# Patient Record
Sex: FEMALE | Race: WHITE | NOT HISPANIC OR LATINO | Employment: PART TIME | URBAN - METROPOLITAN AREA
[De-identification: names, ages, dates, MRNs, and addresses within clinical notes are randomized per-mention and may not be internally consistent; named-entity substitution may affect disease eponyms.]

---

## 2018-03-25 ENCOUNTER — HOSPITAL ENCOUNTER (EMERGENCY)
Facility: HOSPITAL | Age: 23
Discharge: HOME | End: 2018-03-25
Attending: EMERGENCY MEDICINE | Admitting: EMERGENCY MEDICINE

## 2018-03-25 VITALS
HEART RATE: 87 BPM | OXYGEN SATURATION: 100 % | RESPIRATION RATE: 18 BRPM | WEIGHT: 132.28 LBS | TEMPERATURE: 98.4 F | SYSTOLIC BLOOD PRESSURE: 134 MMHG | DIASTOLIC BLOOD PRESSURE: 70 MMHG

## 2018-03-25 DIAGNOSIS — K08.89 TOOTHACHE: Primary | ICD-10-CM

## 2018-03-25 DIAGNOSIS — R11.0 NAUSEA: ICD-10-CM

## 2018-03-25 PROCEDURE — 99282 EMERGENCY DEPT VISIT SF MDM: CPT

## 2018-03-25 RX ORDER — ONDANSETRON 4 MG/1
4 TABLET, ORALLY DISINTEGRATING ORAL ONCE
Status: COMPLETED | OUTPATIENT
Start: 2018-03-25 | End: 2018-03-25

## 2018-03-25 RX ORDER — CLINDAMYCIN HYDROCHLORIDE 150 MG/1
300 CAPSULE ORAL 4 TIMES DAILY
Qty: 56 CAPSULE | Refills: 0 | Status: SHIPPED | OUTPATIENT
Start: 2018-03-25 | End: 2018-04-01

## 2018-03-25 RX ORDER — CLINDAMYCIN HYDROCHLORIDE 150 MG/1
300 CAPSULE ORAL ONCE
Status: COMPLETED | OUTPATIENT
Start: 2018-03-25 | End: 2018-03-25

## 2018-03-25 RX ORDER — OXYCODONE HYDROCHLORIDE AND ACETAMINOPHEN 5; 325 MG/1; MG/1
1 TABLET ORAL ONCE
Status: COMPLETED | OUTPATIENT
Start: 2018-03-25 | End: 2018-03-25

## 2018-03-25 RX ORDER — METOCLOPRAMIDE 10 MG/1
10 TABLET ORAL 4 TIMES DAILY
Qty: 28 TABLET | Refills: 0 | Status: SHIPPED | OUTPATIENT
Start: 2018-03-25 | End: 2018-04-01

## 2018-03-25 RX ORDER — OXYCODONE HYDROCHLORIDE AND ACETAMINOPHEN 5; 325 MG/1; MG/1
1 TABLET ORAL EVERY 4 HOURS PRN
Qty: 20 TABLET | Refills: 0 | Status: SHIPPED | OUTPATIENT
Start: 2018-03-25 | End: 2018-03-29

## 2018-03-25 RX ADMIN — CLINDAMYCIN HYDROCHLORIDE 300 MG: 150 CAPSULE ORAL at 03:23

## 2018-03-25 RX ADMIN — ONDANSETRON 4 MG: 4 TABLET, ORALLY DISINTEGRATING ORAL at 03:22

## 2018-03-25 RX ADMIN — OXYCODONE HYDROCHLORIDE AND ACETAMINOPHEN 1 TABLET: 5; 325 TABLET ORAL at 03:23

## 2018-03-25 NOTE — DISCHARGE INSTRUCTIONS
Acute Nausea and Vomiting   WHAT YOU NEED TO KNOW:   Acute nausea and vomiting start suddenly, worsen quickly, and last a short time  DISCHARGE INSTRUCTIONS:   Return to the emergency department if:   · You see blood in your vomit or your bowel movements  · You have sudden, severe pain in your chest and upper abdomen after hard vomiting or retching  · You have swelling in your neck and chest      · You are dizzy, cold, and thirsty and your eyes and mouth are dry  · You are urinating very little or not at all  · You have muscle weakness, leg cramps, and trouble breathing  · Your heart is beating much faster than normal      · You continue to vomit for more than 48 hours  Contact your healthcare provider if:   · You have frequent dry heaves (vomiting but nothing comes out)  · Your nausea and vomiting does not get better or go away after you use medicine  · You have questions or concerns about your condition or treatment  Medicines: You may need any of the following:  · Medicines  may be given to calm your stomach and stop your vomiting  You may also need medicines to help you feel more relaxed or to stop nausea and vomiting caused by motion sickness  · Gastrointestinal stimulants  are used to help empty your stomach and bowels  This may help decrease nausea and vomiting  · Take your medicine as directed  Contact your healthcare provider if you think your medicine is not helping or if you have side effects  Tell him or her if you are allergic to any medicine  Keep a list of the medicines, vitamins, and herbs you take  Include the amounts, and when and why you take them  Bring the list or the pill bottles to follow-up visits  Carry your medicine list with you in case of an emergency  Prevent or manage acute nausea and vomiting:   · Do not drink alcohol  Alcohol may upset or irritate your stomach  Too much alcohol can also cause acute nausea and vomiting  · Control stress    Headaches due to stress may cause nausea and vomiting  Find ways to relax and manage your stress  Get more rest and sleep  · Drink more liquids as directed  Vomiting can lead to dehydration  It is important to drink more liquids to help replace lost body fluids  Ask your healthcare provider how much liquid to drink each day and which liquids are best for you  Your provider may recommend that you drink an oral rehydration solution (ORS)  ORS contains water, salts, and sugar that are needed to replace the lost body fluids  Ask what kind of ORS to use, how much to drink, and where to get it  · Eat smaller meals, more often  Eat small amounts of food every 2 to 3 hours, even if you are not hungry  Food in your stomach may decrease your nausea  · Talk to your healthcare provider before you take over-the-counter (OTC) medicines  These medicines can cause serious problems if you use certain other medicines, or you have a medical condition  You may have problems if you use too much or use them for longer than the label says  Follow directions on the label carefully  Follow up with your healthcare provider as directed:  Write down your questions so you remember to ask them during your follow-up visits  © 2017 [employer]  Information is for End User's use only and may not be sold, redistributed or otherwise used for commercial purposes  All illustrations and images included in CareNotes® are the copyrighted property of [employer] , [employer]  or [employer]  The above information is an  only  It is not intended as medical advice for individual conditions or treatments  Talk to your doctor, nurse or pharmacist before following any medical regimen to see if it is safe and effective for you  Toothache   WHAT YOU NEED TO KNOW:   A toothache is pain that is caused by irritation of the nerves in the center of your tooth   The irritation may be caused by several problems, such as a cavity, an infection, a cracked tooth, or gum disease  It is very important to follow up with your dentist so the cause of your toothache can be diagnosed and treated  This can help prevent more serious problems  DISCHARGE INSTRUCTIONS:   Medicines: You may  need any of the following:  · NSAIDs  decrease swelling and pain  This medicine can be bought with or without a doctor's order  This medicine can cause stomach bleeding or kidney problems in certain people  If you take blood thinner medicine, always ask your healthcare provider if NSAIDs are safe for you  Always read the medicine label and follow the directions on it before using this medicine  · Acetaminophen  decreases pain  It is available without a doctor's order  Ask how much to take and how often to take it  Follow directions  Acetaminophen can cause liver damage if not taken correctly  · Pain medicine  may be given as a pill or as medicine that you put directly on your tooth or gums  Do not wait until the pain is severe before you take this medicine  · Antibiotics  help fight or prevent an infection caused by bacteria  Take them as directed  · Take your medicine as directed  Contact your healthcare provider if you think your medicine is not helping or if you have side effects  Tell him of her if you are allergic to any medicine  Keep a list of the medicines, vitamins, and herbs you take  Include the amounts, and when and why you take them  Bring the list or the pill bottles to follow-up visits  Carry your medicine list with you in case of an emergency  Follow up with your dentist as directed: You may be referred to a dental surgeon  Write down your questions so you remember to ask them during your visits  Self-care:   · Rinse your mouth with warm salt water 4 times a day or as directed  · You may need to eat soft foods to help relieve pain caused by chewing    Contact your dentist if:   · You have questions or concerns about your condition or care  Return to the emergency department if:   · You have trouble breathing  · You have swelling in your face or neck  · You have a fever and chills  · You have trouble speaking or swallowing  · You have trouble opening or closing your mouth  © 2017 [employer] Information is for End User's use only and may not be sold, redistributed or otherwise used for commercial purposes  All illustrations and images included in CareNotes® are the copyrighted property of [employer]  or [employer]  The above information is an  only  It is not intended as medical advice for individual conditions or treatments  Talk to your doctor, nurse or pharmacist before following any medical regimen to see if it is safe and effective for you

## 2018-03-25 NOTE — ED PROVIDER NOTES
History  Chief Complaint   Patient presents with    Dental Pain     Pt  complains of broken molar on bottom left jaw, states has dentist appt  monday but pain is unbearable and not improving with tylenol or motrin  Patient is a 25year old female with 3 days of worsening left lower tooth pain where broken tooth is  Has appointment with dentist this Monday  Tried ibuprofen without relief  Occasional nausea  No fever  No vomiting  No sob  States she did not drive here  Was last seen in this ED on 4/8/16 for nausea and vomiting  [facility] website checked on this patient and no Rx found  History provided by:  Patient (adri)   used: No    Dental Pain   Associated symptoms: no fever        Prior to Admission Medications   Prescriptions Last Dose Informant Patient Reported? Taking?   citalopram (CeleXA) 10 mg tablet   Yes No   Sig: Take 10 mg by mouth daily  ondansetron (ZOFRAN ODT) 4 mg disintegrating tablet   No No   Sig: Take 2 tablets (8 mg total) by mouth every 8 (eight) hours as needed for nausea or vomiting for up to 6 doses  Facility-Administered Medications: None       History reviewed  No pertinent past medical history  History reviewed  No pertinent surgical history  No family history on file  I have reviewed and agree with the history as documented  Social History   Substance Use Topics    Smoking status: Current Every Day Smoker     Packs/day: 0 50     Types: Cigarettes    Smokeless tobacco: Never Used    Alcohol use No        Review of Systems   Constitutional: Negative for fever  HENT: Positive for dental problem  Respiratory: Negative for shortness of breath  Gastrointestinal: Positive for nausea  Negative for vomiting         Physical Exam  ED Triage Vitals [03/25/18 0220]   Temperature Pulse Respirations Blood Pressure SpO2   98 4 °F (36 9 °C) 87 18 134/70 100 %      Temp Source Heart Rate Source Patient Position - Orthostatic VS BP Location FiO2 (%)   Oral Monitor Sitting Right arm --      Pain Score       7           Orthostatic Vital Signs  Vitals:    03/25/18 0220   BP: 134/70   Pulse: 87   Patient Position - Orthostatic VS: Sitting       Physical Exam   Constitutional: She appears well-developed and well-nourished  She appears distressed (moderate)  HENT:   Head: Normocephalic and atraumatic  Mouth/Throat: Oropharynx is clear and moist    Left mandibular molar tooth fracture with tenderness and no significant gingival swelling  Eyes: No scleral icterus  Neck: Normal range of motion  Neck supple  Pulmonary/Chest: Effort normal  No stridor  No respiratory distress  Neurological: She is alert  Skin: Skin is warm and dry  No rash noted  Nursing note and vitals reviewed  ED Medications  Medications   clindamycin (CLEOCIN) capsule 300 mg (not administered)   oxyCODONE-acetaminophen (PERCOCET) 5-325 mg per tablet 1 tablet (not administered)   ondansetron (ZOFRAN-ODT) dispersible tablet 4 mg (not administered)       Diagnostic Studies  Results Reviewed     None                 No orders to display              Procedures  Procedures       Phone Contacts  ED Phone Contact    ED Course  ED Course                                MDM  Number of Diagnoses or Management Options  Diagnosis management comments: DDx including but not limited to: dental elieser, dental infection, dental abscess, dry socket, gingivitis; doubt lyle's angina          Amount and/or Complexity of Data Reviewed  Decide to obtain previous medical records or to obtain history from someone other than the patient: yes  Review and summarize past medical records: yes      CritCare Time    Disposition  Final diagnoses:   Toothache   Nausea     Time reflects when diagnosis was documented in both MDM as applicable and the Disposition within this note     Time User Action Codes Description Comment    3/25/2018  2:39 AM Selina Antonio Add [K08 89] Toothache 3/25/2018  2:39 AM Oralee Petar Add [R11 0] Nausea       ED Disposition     ED Disposition Condition Comment    Discharge  Diantha Sine discharge to home/self care  Condition at discharge: Stable        Follow-up Information     Follow up With Specialties Details Why Contact Info    own dentist  Go in 2 days Return sooner if increased pain, fever, vomiting, difficulty breathing or swallowing, drooling  No driving with percocet  Do not use acetaminophen with percocet  Patient's Medications   Discharge Prescriptions    CLINDAMYCIN (CLEOCIN) 150 MG CAPSULE    Take 2 capsules (300 mg total) by mouth 4 (four) times a day for 7 days       Start Date: 3/25/2018 End Date: 4/1/2018       Order Dose: 300 mg       Quantity: 56 capsule    Refills: 0    METOCLOPRAMIDE (REGLAN) 10 MG TABLET    Take 1 tablet (10 mg total) by mouth 4 (four) times a day for 7 days As needed for nausea       Start Date: 3/25/2018 End Date: 4/1/2018       Order Dose: 10 mg       Quantity: 28 tablet    Refills: 0    OXYCODONE-ACETAMINOPHEN (PERCOCET) 5-325 MG PER TABLET    Take 1 tablet by mouth every 4 (four) hours as needed for moderate pain for up to 4 days Max Daily Amount: 6 tablets       Start Date: 3/25/2018 End Date: 3/29/2018       Order Dose: 1 tablet       Quantity: 20 tablet    Refills: 0     No discharge procedures on file      ED Provider  Electronically Signed by           Stuart Howard MD  03/25/18 4915

## 2019-07-28 ENCOUNTER — HOSPITAL ENCOUNTER (EMERGENCY)
Facility: HOSPITAL | Age: 24
Discharge: HOME | End: 2019-07-28
Attending: EMERGENCY MEDICINE
Payer: COMMERCIAL

## 2019-07-28 VITALS
SYSTOLIC BLOOD PRESSURE: 156 MMHG | RESPIRATION RATE: 16 BRPM | HEART RATE: 71 BPM | TEMPERATURE: 98.3 F | DIASTOLIC BLOOD PRESSURE: 73 MMHG | OXYGEN SATURATION: 96 %

## 2019-07-28 DIAGNOSIS — K08.89 PAIN, DENTAL: Primary | ICD-10-CM

## 2019-07-28 PROCEDURE — 99282 EMERGENCY DEPT VISIT SF MDM: CPT

## 2019-07-28 PROCEDURE — 99283 EMERGENCY DEPT VISIT LOW MDM: CPT | Performed by: EMERGENCY MEDICINE

## 2019-07-28 RX ORDER — CLINDAMYCIN HYDROCHLORIDE 150 MG/1
450 CAPSULE ORAL EVERY 8 HOURS SCHEDULED
Qty: 63 CAPSULE | Refills: 0 | Status: SHIPPED | OUTPATIENT
Start: 2019-07-28 | End: 2019-08-04

## 2019-07-28 RX ORDER — IBUPROFEN 400 MG/1
400 TABLET ORAL EVERY 6 HOURS PRN
Qty: 30 TABLET | Refills: 0 | Status: SHIPPED | OUTPATIENT
Start: 2019-07-28 | End: 2021-08-01

## 2019-07-28 RX ORDER — ACETAMINOPHEN 325 MG/1
650 TABLET ORAL EVERY 6 HOURS PRN
Qty: 30 TABLET | Refills: 0 | Status: SHIPPED | OUTPATIENT
Start: 2019-07-28 | End: 2019-08-02

## 2019-07-28 NOTE — ED PROVIDER NOTES
History  Chief Complaint   Patient presents with    Dental Pain     Pt presents to the ED with c/o R sided tooth pain  HPI     Patient is a 26year old female with right lower molar tooth pain  Pain is achi  Pain is present for past several days  Worse with touching  No radiation of the pain  No systemic fevers, chills, sweats  No focal neurological defects  No visual disturbance  No hearing loss/tinnitus/vertigo  No pain behind the ear  No parotid gland tenderness  No neck pain or trouble swallowing  No deviation of the tonsils to suggest peritonsillar abscess  No obvious drainable intraoral abscess  No facial rash or blisters  No woodiness or swelling underneath the tongue  No claudication when chewing  No headache  MDM toothache, will treat as such  The patient (and any family present) verbalized understanding of the discharge instructions and warnings that would necessitate return to the Emergency Department  Gave verbal in addition to written discharge instructions  Specifically highlighted areas of special concern regarding the written and verbal discharge instructions and return precautions  All questions were answered prior to discharge  None       History reviewed  No pertinent past medical history  History reviewed  No pertinent surgical history  History reviewed  No pertinent family history  I have reviewed and agree with the history as documented  Social History     Tobacco Use    Smoking status: Current Every Day Smoker     Packs/day: 0 50     Types: Cigarettes    Smokeless tobacco: Never Used   Substance Use Topics    Alcohol use: No    Drug use: No     Types: Marijuana     Comment: history of        Review of Systems   HENT:        Tooth pain   All other systems reviewed and are negative  Physical Exam  Physical Exam   Constitutional: She is oriented to person, place, and time  She appears well-developed and well-nourished  HENT:   Head: Normocephalic and atraumatic  Right Ear: External ear normal    Left Ear: External ear normal    Right lower molar with dental caries   Eyes: Conjunctivae and EOM are normal    Neck: Normal range of motion  Neck supple  No JVD present  No tracheal deviation present  Cardiovascular: Normal rate, regular rhythm and normal heart sounds  Pulmonary/Chest: Effort normal  No respiratory distress  She has no wheezes  She has no rales  Abdominal: Soft  Bowel sounds are normal  There is no tenderness  There is no rebound and no guarding  Musculoskeletal: She exhibits no edema or tenderness  Neurological: She is alert and oriented to person, place, and time  Skin: Skin is warm and dry  No rash noted  No erythema  Psychiatric: She has a normal mood and affect  Thought content normal    Nursing note and vitals reviewed  Vital Signs  ED Triage Vitals [07/28/19 0558]   Temperature Pulse Respirations Blood Pressure SpO2   98 3 °F (36 8 °C) 71 16 156/73 96 %      Temp Source Heart Rate Source Patient Position - Orthostatic VS BP Location FiO2 (%)   Oral Monitor Sitting Right arm --      Pain Score       7           Vitals:    07/28/19 0558   BP: 156/73   Pulse: 71   Patient Position - Orthostatic VS: Sitting         Visual Acuity      ED Medications  Medications - No data to display    Diagnostic Studies  Results Reviewed     None                 No orders to display              Procedures  Procedures       ED Course                               MDM    Disposition  Final diagnoses:   Pain, dental     Time reflects when diagnosis was documented in both MDM as applicable and the Disposition within this note     Time User Action Codes Description Comment    7/28/2019  5:59 AM Fariba GOLDMAN Add [K08 89] Pain, dental       ED Disposition     ED Disposition Condition Date/Time Comment    Discharge Stable Sun Jul 28, 2019  5:58 AM Judy Elias discharge to home/self care              Follow-up Information     Follow up With Specialties Details Why Contact Info    Mercedes Jackson DO Family Medicine In 1 day  [address] [address]  [phone]            Patient's Medications   Discharge Prescriptions    ACETAMINOPHEN (TYLENOL) 325 MG TABLET    Take 2 tablets (650 mg total) by mouth every 6 (six) hours as needed for mild pain for up to 5 days       Start Date: 7/28/2019 End Date: 8/2/2019       Order Dose: 650 mg       Quantity: 30 tablet    Refills: 0    CLINDAMYCIN (CLEOCIN) 150 MG CAPSULE    Take 3 capsules (450 mg total) by mouth every 8 (eight) hours for 7 days       Start Date: 7/28/2019 End Date: 8/4/2019       Order Dose: 450 mg       Quantity: 63 capsule    Refills: 0    IBUPROFEN (MOTRIN) 400 MG TABLET    Take 1 tablet (400 mg total) by mouth every 6 (six) hours as needed for mild pain for up to 5 days       Start Date: 7/28/2019 End Date: 8/2/2019       Order Dose: 400 mg       Quantity: 30 tablet    Refills: 0     No discharge procedures on file      ED Provider  Electronically Signed by           Nivia Narayanan MD  07/28/19 7409

## 2020-06-20 ENCOUNTER — HOSPITAL ENCOUNTER (EMERGENCY)
Facility: HOSPITAL | Age: 25
Discharge: HOME | End: 2020-06-20
Attending: EMERGENCY MEDICINE | Admitting: EMERGENCY MEDICINE
Payer: COMMERCIAL

## 2020-06-20 VITALS
SYSTOLIC BLOOD PRESSURE: 132 MMHG | DIASTOLIC BLOOD PRESSURE: 70 MMHG | BODY MASS INDEX: 25.59 KG/M2 | TEMPERATURE: 98.8 F | HEART RATE: 78 BPM | HEIGHT: 63 IN | RESPIRATION RATE: 16 BRPM | OXYGEN SATURATION: 99 % | WEIGHT: 144.4 LBS

## 2020-06-20 DIAGNOSIS — K04.7 DENTAL INFECTION: Primary | ICD-10-CM

## 2020-06-20 PROCEDURE — 99284 EMERGENCY DEPT VISIT MOD MDM: CPT | Performed by: PHYSICIAN ASSISTANT

## 2020-06-20 PROCEDURE — 99282 EMERGENCY DEPT VISIT SF MDM: CPT

## 2020-06-20 RX ORDER — OXYCODONE HYDROCHLORIDE AND ACETAMINOPHEN 5; 325 MG/1; MG/1
1 TABLET ORAL EVERY 6 HOURS PRN
Qty: 10 TABLET | Refills: 0 | Status: SHIPPED | OUTPATIENT
Start: 2020-06-20 | End: 2021-08-01

## 2021-04-13 DIAGNOSIS — Z23 ENCOUNTER FOR IMMUNIZATION: ICD-10-CM

## 2021-08-01 ENCOUNTER — APPOINTMENT (EMERGENCY)
Dept: CT IMAGING | Facility: HOSPITAL | Age: 26
End: 2021-08-01
Payer: COMMERCIAL

## 2021-08-01 ENCOUNTER — HOSPITAL ENCOUNTER (EMERGENCY)
Facility: HOSPITAL | Age: 26
Discharge: HOME | End: 2021-08-01
Attending: EMERGENCY MEDICINE | Admitting: EMERGENCY MEDICINE
Payer: COMMERCIAL

## 2021-08-01 VITALS
SYSTOLIC BLOOD PRESSURE: 157 MMHG | TEMPERATURE: 98.8 F | BODY MASS INDEX: 23.35 KG/M2 | HEART RATE: 86 BPM | WEIGHT: 131.84 LBS | RESPIRATION RATE: 20 BRPM | OXYGEN SATURATION: 99 % | DIASTOLIC BLOOD PRESSURE: 82 MMHG

## 2021-08-01 DIAGNOSIS — R55 NEAR SYNCOPE: Primary | ICD-10-CM

## 2021-08-01 LAB
ALBUMIN SERPL BCP-MCNC: 4.1 G/DL (ref 3.5–5)
ALP SERPL-CCNC: 62 U/L (ref 46–116)
ALT SERPL W P-5'-P-CCNC: 17 U/L (ref 12–78)
ANION GAP SERPL CALCULATED.3IONS-SCNC: 10 MMOL/L (ref 4–13)
AST SERPL W P-5'-P-CCNC: 15 U/L (ref 5–45)
BASOPHILS # BLD AUTO: 0.09 THOUSANDS/ΜL (ref 0–0.1)
BASOPHILS NFR BLD AUTO: 1 % (ref 0–1)
BILIRUB SERPL-MCNC: 0.52 MG/DL (ref 0.2–1)
BUN SERPL-MCNC: 10 MG/DL (ref 5–25)
CALCIUM SERPL-MCNC: 8.9 MG/DL (ref 8.3–10.1)
CHLORIDE SERPL-SCNC: 103 MMOL/L (ref 100–108)
CO2 SERPL-SCNC: 26 MMOL/L (ref 21–32)
CREAT SERPL-MCNC: 0.71 MG/DL (ref 0.6–1.3)
EOSINOPHIL # BLD AUTO: 0.27 THOUSAND/ΜL (ref 0–0.61)
EOSINOPHIL NFR BLD AUTO: 3 % (ref 0–6)
ERYTHROCYTE [DISTWIDTH] IN BLOOD BY AUTOMATED COUNT: 12.1 % (ref 11.6–15.1)
GFR SERPL CREATININE-BSD FRML MDRD: 120 ML/MIN/1.73SQ M
GLUCOSE SERPL-MCNC: 87 MG/DL (ref 65–140)
GLUCOSE SERPL-MCNC: 99 MG/DL (ref 65–140)
HCT VFR BLD AUTO: 42 % (ref 34.8–46.1)
HGB BLD-MCNC: 14.1 G/DL (ref 11.5–15.4)
IMM GRANULOCYTES # BLD AUTO: 0.05 THOUSAND/UL (ref 0–0.2)
IMM GRANULOCYTES NFR BLD AUTO: 1 % (ref 0–2)
LYMPHOCYTES # BLD AUTO: 3.63 THOUSANDS/ΜL (ref 0.6–4.47)
LYMPHOCYTES NFR BLD AUTO: 38 % (ref 14–44)
MCH RBC QN AUTO: 29.4 PG (ref 26.8–34.3)
MCHC RBC AUTO-ENTMCNC: 33.6 G/DL (ref 31.4–37.4)
MCV RBC AUTO: 88 FL (ref 82–98)
MONOCYTES # BLD AUTO: 0.6 THOUSAND/ΜL (ref 0.17–1.22)
MONOCYTES NFR BLD AUTO: 6 % (ref 4–12)
NEUTROPHILS # BLD AUTO: 4.82 THOUSANDS/ΜL (ref 1.85–7.62)
NEUTS SEG NFR BLD AUTO: 51 % (ref 43–75)
NRBC BLD AUTO-RTO: 0 /100 WBCS
PLATELET # BLD AUTO: 245 THOUSANDS/UL (ref 149–390)
PMV BLD AUTO: 9.9 FL (ref 8.9–12.7)
POTASSIUM SERPL-SCNC: 3.8 MMOL/L (ref 3.5–5.3)
PROT SERPL-MCNC: 7.2 G/DL (ref 6.4–8.2)
RBC # BLD AUTO: 4.79 MILLION/UL (ref 3.81–5.12)
SODIUM SERPL-SCNC: 139 MMOL/L (ref 136–145)
TROPONIN I SERPL-MCNC: <0.02 NG/ML
TSH SERPL DL<=0.05 MIU/L-ACNC: 1.43 UIU/ML (ref 0.36–3.74)
WBC # BLD AUTO: 9.46 THOUSAND/UL (ref 4.31–10.16)

## 2021-08-01 PROCEDURE — 96360 HYDRATION IV INFUSION INIT: CPT

## 2021-08-01 PROCEDURE — 80053 COMPREHEN METABOLIC PANEL: CPT | Performed by: PHYSICIAN ASSISTANT

## 2021-08-01 PROCEDURE — 84443 ASSAY THYROID STIM HORMONE: CPT | Performed by: PHYSICIAN ASSISTANT

## 2021-08-01 PROCEDURE — 93005 ELECTROCARDIOGRAM TRACING: CPT

## 2021-08-01 PROCEDURE — G1004 CDSM NDSC: HCPCS

## 2021-08-01 PROCEDURE — 84484 ASSAY OF TROPONIN QUANT: CPT | Performed by: PHYSICIAN ASSISTANT

## 2021-08-01 PROCEDURE — 82948 REAGENT STRIP/BLOOD GLUCOSE: CPT

## 2021-08-01 PROCEDURE — 36415 COLL VENOUS BLD VENIPUNCTURE: CPT | Performed by: PHYSICIAN ASSISTANT

## 2021-08-01 PROCEDURE — 99285 EMERGENCY DEPT VISIT HI MDM: CPT | Performed by: PHYSICIAN ASSISTANT

## 2021-08-01 PROCEDURE — 99284 EMERGENCY DEPT VISIT MOD MDM: CPT

## 2021-08-01 PROCEDURE — 85025 COMPLETE CBC W/AUTO DIFF WBC: CPT | Performed by: PHYSICIAN ASSISTANT

## 2021-08-01 PROCEDURE — 70450 CT HEAD/BRAIN W/O DYE: CPT

## 2021-08-01 RX ADMIN — SODIUM CHLORIDE 1000 ML: 0.9 INJECTION, SOLUTION INTRAVENOUS at 20:04

## 2021-08-01 NOTE — Clinical Note
Rosanne Friends was seen and treated in our emergency department on 8/1/2021  Diagnosis:     Darrius    She may return on this date: 08/03/2021         If you have any questions or concerns, please don't hesitate to call        Sin Madsen PA-C    ______________________________           _______________          _______________  Hospital Representative                              Date                                Time

## 2021-08-01 NOTE — ED PROVIDER NOTES
History  Chief Complaint   Patient presents with    Syncope     per pt "she has been having some near syncopal episodes foe a while now with some lightheadedness"     Bhupendra Jama is a 26 y o  female who presents to the ED with complaints of near syncope  Patient states she has felt lightheaded today and had 2 episode of nearly passing out  Patient states on the first occasion she was sitting down at 1200 eating breakfast when she felt like she was going to pass out  Patient states on the second occasion she was walking and felt hot/sweaty  Patient states she has been having irregular menstrual cycles over the past few months  Denies head injury, headache, neck pain, neck stiffness, numbness, tingling, weakness, visual changes, nausea, vomiting, diarrhea, abdominal pain, chest pain, shortness breath, fever, chills, leg pain, leg swelling  Denies exogenous estrogen  Denies family history of early cardiac disease or sudden death  Patient states she was told that she had a heart murmur in childhood  Patient denies chance of pregnancy and is in a monogamous relationship with a female  History provided by:  Patient  Dizziness  Quality:  Lightheadedness  Duration:  1 day  Context: not when bending over, not with head movement and not when standing up    Associated symptoms: palpitations (States this is due to "anxiety" associated with feelling like she is going to pass out)    Associated symptoms: no blood in stool, no chest pain, no diarrhea, no headaches, no hearing loss, no nausea, no shortness of breath, no syncope, no tinnitus, no vision changes, no vomiting and no weakness        None       History reviewed  No pertinent past medical history  Past Surgical History:   Procedure Laterality Date    TONSILLECTOMY         History reviewed  No pertinent family history  I have reviewed and agree with the history as documented      E-Cigarette/Vaping    E-Cigarette Use Current Some Day User E-Cigarette/Vaping Substances    Nicotine Yes      Social History     Tobacco Use    Smoking status: Former Smoker     Years: 5 00     Quit date: 6/20/2018     Years since quitting: 3 1    Smokeless tobacco: Never Used   Vaping Use    Vaping Use: Some days    Substances: Nicotine   Substance Use Topics    Alcohol use: No    Drug use: No     Types: Marijuana     Comment: history of       Review of Systems   Constitutional: Positive for diaphoresis  Negative for appetite change, chills, fever and unexpected weight change  HENT: Negative for congestion, drooling, ear pain, hearing loss, rhinorrhea, sore throat, tinnitus, trouble swallowing and voice change  Eyes: Negative for pain, discharge, redness and visual disturbance  Respiratory: Negative for cough, shortness of breath, wheezing and stridor  Cardiovascular: Positive for palpitations (States this is due to "anxiety" associated with feelling like she is going to pass out)  Negative for chest pain, leg swelling and syncope  Gastrointestinal: Negative for abdominal pain, blood in stool, constipation, diarrhea, nausea and vomiting  Genitourinary: Positive for menstrual problem  Negative for decreased urine volume, dysuria, flank pain, frequency, hematuria, urgency, vaginal bleeding and vaginal discharge  Musculoskeletal: Negative for gait problem, joint swelling, neck pain and neck stiffness  Skin: Negative for color change and rash  Neurological: Positive for dizziness and light-headedness  Negative for seizures, weakness and headaches  Shakiness       Physical Exam  Physical Exam  Vitals and nursing note reviewed  Constitutional:       General: She is not in acute distress  Appearance: She is well-developed  She is not ill-appearing  HENT:      Head: Normocephalic and atraumatic        Right Ear: Tympanic membrane, ear canal and external ear normal       Left Ear: Tympanic membrane, ear canal and external ear normal  Nose: Nose normal       Mouth/Throat:      Mouth: Mucous membranes are moist    Eyes:      Conjunctiva/sclera: Conjunctivae normal       Pupils: Pupils are equal, round, and reactive to light  Cardiovascular:      Rate and Rhythm: Normal rate and regular rhythm  Heart sounds: Murmur heard  Pulmonary:      Effort: Pulmonary effort is normal       Breath sounds: Normal breath sounds  Abdominal:      General: Abdomen is flat  Bowel sounds are normal       Tenderness: There is no abdominal tenderness  Musculoskeletal:         General: Normal range of motion  Cervical back: Normal range of motion and neck supple  Skin:     General: Skin is warm and dry  Capillary Refill: Capillary refill takes less than 2 seconds  Neurological:      General: No focal deficit present  Mental Status: She is alert and oriented to person, place, and time  GCS: GCS eye subscore is 4  GCS verbal subscore is 5  GCS motor subscore is 6  Cranial Nerves: Cranial nerves are intact  Sensory: Sensation is intact  Motor: Motor function is intact  Coordination: Coordination is intact  Gait: Gait is intact  Deep Tendon Reflexes: Reflexes are normal and symmetric           Vital Signs  ED Triage Vitals [08/01/21 1832]   Temperature Pulse Respirations Blood Pressure SpO2   98 8 °F (37 1 °C) 86 20 157/82 99 %      Temp Source Heart Rate Source Patient Position - Orthostatic VS BP Location FiO2 (%)   Oral Monitor Lying Right arm --      Pain Score       No Pain           Vitals:    08/01/21 1832   BP: 157/82   Pulse: 86   Patient Position - Orthostatic VS: Lying         Visual Acuity      ED Medications  Medications   sodium chloride 0 9 % bolus 1,000 mL (1,000 mL Intravenous New Bag 8/1/21 2004)       Diagnostic Studies  Results Reviewed     Procedure Component Value Units Date/Time    TSH [[id number]]  (Normal) Collected: 08/01/21 1921    Lab Status: Final result Specimen: Blood from Arm, Right Updated: 08/01/21 2016     TSH 3RD GENERATON 1 429 uIU/mL     Narrative:      Patients undergoing fluorescein dye angiography may retain small amounts of fluorescein in the body for 48-72 hours post procedure  Samples containing fluorescein can produce falsely depressed TSH values  If the patient had this procedure,a specimen should be resubmitted post fluorescein clearance        Comprehensive metabolic panel [[id number]] Collected: 08/01/21 1921    Lab Status: Final result Specimen: Blood from Arm, Right Updated: 08/01/21 2014     Sodium 139 mmol/L      Potassium 3 8 mmol/L      Chloride 103 mmol/L      CO2 26 mmol/L      ANION GAP 10 mmol/L      BUN 10 mg/dL      Creatinine 0 71 mg/dL      Glucose 87 mg/dL      Calcium 8 9 mg/dL      AST 15 U/L      ALT 17 U/L      Alkaline Phosphatase 62 U/L      Total Protein 7 2 g/dL      Albumin 4 1 g/dL      Total Bilirubin 0 52 mg/dL      eGFR 120 ml/min/1 73sq m     Narrative:      [place] guidelines for Chronic Kidney Disease (CKD):     Stage 1 with normal or high GFR (GFR > 90 mL/min/1 73 square meters)    Stage 2 Mild CKD (GFR = 60-89 mL/min/1 73 square meters)    Stage 3A Moderate CKD (GFR = 45-59 mL/min/1 73 square meters)    Stage 3B Moderate CKD (GFR = 30-44 mL/min/1 73 square meters)    Stage 4 Severe CKD (GFR = 15-29 mL/min/1 73 square meters)    Stage 5 End Stage CKD (GFR <15 mL/min/1 73 square meters)  Note: GFR calculation is accurate only with a steady state creatinine    Troponin I [[id number]]  (Normal) Collected: 08/01/21 1921    Lab Status: Final result Specimen: Blood from Arm, Right Updated: 08/01/21 2010     Troponin I <0 02 ng/mL     CBC and differential [[id number]] Collected: 08/01/21 1921    Lab Status: Final result Specimen: Blood from Arm, Right Updated: 08/01/21 1945     WBC 9 46 Thousand/uL      RBC 4 79 Million/uL      Hemoglobin 14 1 g/dL      Hematocrit 42 0 %      MCV 88 fL      MCH 29 4 pg      MCHC 33 6 g/dL RDW 12 1 %      MPV 9 9 fL      Platelets 096 Thousands/uL      nRBC 0 /100 WBCs      Neutrophils Relative 51 %      Immat GRANS % 1 %      Lymphocytes Relative 38 %      Monocytes Relative 6 %      Eosinophils Relative 3 %      Basophils Relative 1 %      Neutrophils Absolute 4 82 Thousands/µL      Immature Grans Absolute 0 05 Thousand/uL      Lymphocytes Absolute 3 63 Thousands/µL      Monocytes Absolute 0 60 Thousand/µL      Eosinophils Absolute 0 27 Thousand/µL      Basophils Absolute 0 09 Thousands/µL     Fingerstick Glucose (POCT) [[id number]]  (Normal) Collected: 08/01/21 1834    Lab Status: Final result Updated: 08/01/21 1837     POC Glucose 99 mg/dl                  CT head without contrast    (Results Pending)              Procedures  ECG 12 Lead Documentation Only    Date/Time: 8/1/2021 7:27 PM  Performed by: Steffi Gant PA-C  Authorized by: Kenna Alonso MD     Indications / Diagnosis:  Near Syncope  Patient location:  ED  Rate:     ECG rate:  65    ECG rate assessment: normal    Rhythm:     Rhythm: sinus rhythm    QRS:     QRS axis:  Normal  ST segments:     ST segments:  Normal  T waves:     T waves: flattening      Flattening:  V2  Comments:      No acute ST or T wave changes  QT/QTc 384/399             ED Course                                           MDM  Number of Diagnoses or Management Options  Near syncope: new and requires workup  Diagnosis management comments: EKG unremarkable  Labs WNL  CT Head pending  Signed out to Wade Salcedo PA-C pending CT Head and final disposition          Amount and/or Complexity of Data Reviewed  Clinical lab tests: ordered and reviewed  Tests in the radiology section of CPT®: ordered and reviewed  Review and summarize past medical records: yes  Discuss the patient with other providers: yes Clayton Glad)    Patient Progress  Patient progress: stable      Disposition  Final diagnoses:   Near syncope     Time reflects when diagnosis was documented in both MDM as applicable and the Disposition within this note     Time User Action Codes Description Comment    8/1/2021  7:29 PM Patt David Add [R55] Near syncope       ED Disposition     None      Follow-up Information     Follow up With Specialties Details Why Contact Info Additional [address] Emergency Department Emergency Medicine Go to  If symptoms worsen [address] [facility] [address] [facility] Emergency Department, [address], [place], [address],  Family Medicine Schedule an appointment as soon as possible for a visit   [address] [facility] [address] [facility] Cardiology Call   [address] [facility] [phone]  [address] [facility] Cardiology [address] [facility], [address], [place], [facility] 59          Patient's Medications   Discharge Prescriptions    No medications on file     No discharge procedures on file      PDMP Review     None          ED Provider  Electronically Signed by           Alma Erwin PA-C  08/01/21 2027

## 2021-08-02 LAB
ATRIAL RATE: 65 BPM
P AXIS: 58 DEGREES
PR INTERVAL: 166 MS
QRS AXIS: 90 DEGREES
QRSD INTERVAL: 80 MS
QT INTERVAL: 384 MS
QTC INTERVAL: 399 MS
T WAVE AXIS: 57 DEGREES
VENTRICULAR RATE: 65 BPM

## 2021-08-02 PROCEDURE — 93010 ELECTROCARDIOGRAM REPORT: CPT | Performed by: INTERNAL MEDICINE

## 2021-08-02 NOTE — ED CARE HANDOFF
Emergency Department Sign Out Note        Sign out and transfer of care from [place], [place]  See Separate Emergency Department note  The patient, Juancho Medrano, was evaluated by the previous provider for near-syncope  Workup Completed:  TSH, CMP, CBC, troponin, ECG, fingerstick glucose    ED Course / Workup Pending (followup):  CT head without contrast-impression no acute intracranial abnormality "  Patient with verbal understanding of all clinical laboratory imaging findings, discharge instructions, follow-up verbalized agreement with patient current treatment plan  Follow-up with Cardiology, PCP, emergency department symptoms persist or exacerbate                                ED Course as of Aug 01 2053   Corey De La Torre Aug 01, 2021   2020 Sign out from [place], [place] with dispo pending CT, and follow-up with cardiology for murmur          Procedures  MDM    Disposition  Final diagnoses:   Near syncope     Time reflects when diagnosis was documented in both MDM as applicable and the Disposition within this note     Time User Action Codes Description Comment    8/1/2021  7:29 PM Shahzad Gtz Add [R55] Near syncope       ED Disposition     None      Follow-up Information     Follow up With Specialties Details Why Contact Info Additional [address] Emergency Department Emergency Medicine Go to  If symptoms worsen 2220 [facility] [address] [facility] Emergency Department, [address] [place], [place], [address],  Family Medicine Schedule an appointment as soon as possible for a visit   [address] [place] [address] [facility] [place] Cardiology Call   [address] [place] [phone] [address] Cardiology [address] [facility], [address], [place], [place] [address] Patient's Medications   Discharge Prescriptions    No medications on file     No discharge procedures on file         ED Provider  Electronically Signed by     Kae Sky PA-C  08/01/21 8516

## 2021-08-02 NOTE — DISCHARGE INSTRUCTIONS
Increase fluid intake  Follow up with your PCP  Make an appointment with cardiology    Syncope   WHAT YOU NEED TO KNOW:   Syncope is also called fainting or passing out  Syncope is a sudden, temporary loss of consciousness, followed by a fall from a standing or sitting position  Syncope ranges from not serious to a sign of a more serious condition that needs to be treated  You can control some health conditions that cause syncope  Your healthcare providers can help you create a plan to manage syncope and prevent episodes  DISCHARGE INSTRUCTIONS:   Seek care immediately if:   You are bleeding because you hit your head when you fainted  You suddenly have double vision, difficulty speaking, numbness, and cannot move your arms or legs  You have chest pain and trouble breathing  You vomit blood or material that looks like coffee grounds  You see blood in your bowel movement  Contact your healthcare provider if:   You have new or worsening symptoms  You have another syncope episode  You have a headache, fast heartbeat, or feel too dizzy to stand up  You have questions or concerns about your condition or care  Medicines:   Medicines  may be needed to help your heart pump strongly and regularly  Your healthcare provider may also make changes to any medicines that are causing syncope  Take your medicine as directed  Contact your healthcare provider if you think your medicine is not helping or if you have side effects  Tell him or her if you are allergic to any medicine  Keep a list of the medicines, vitamins, and herbs you take  Include the amounts, and when and why you take them  Bring the list or the pill bottles to follow-up visits  Carry your medicine list with you in case of an emergency  Follow up with your healthcare provider as directed:  Write down your questions so you remember to ask them during your visits  Manage syncope:   Keep a record of your syncope episodes    Include your symptoms and your activity before and after the episode  The record can help your healthcare provider find the cause of your syncope and help you manage episodes  Sit or lie down when needed  This includes when you feel dizzy, your throat is getting tight, and your vision changes  Raise your legs above the level of your heart  Take slow, deep breaths if you start to breathe faster with anxiety or fear  This can help decrease dizziness and the feeling that you might faint  Check your blood pressure often  This is important if you take medicine to lower your blood pressure  Check your blood pressure when you are lying down and when you are standing  Ask how often to check during the day  Keep a record of your blood pressure numbers  Your healthcare provider may use the record to help plan your treatment  Prevent a syncope episode: Move slowly and let yourself get used to one position before you move to another position  This is very important when you change from a lying or sitting position to a standing position  Take some deep breaths before you stand up from a lying position  Stand up slowly  Sudden movements may cause a fainting spell  Sit on the side of the bed or couch for a few minutes before you stand up  If you are on bedrest, try to be upright for about 2 hours each day, or as directed  Do not lock your legs if you are standing for a long period of time  Move your legs and bend your knees to keep blood flowing  Follow your healthcare provider's recommendations  Your provider may  recommend that you drink more liquids to prevent dehydration  You may also need to have more salt to keep your blood pressure from dropping too low and causing syncope  Your provider will tell you how much liquid and sodium to have each day  He or she will also tell you how much physical activity is safe for you  This will depend on what is causing your syncope  Watch for signs of low blood sugar  These include hunger, nervousness, sweating, and fast or fluttery heartbeats  Talk with your healthcare provider about ways to keep your blood sugar level steady  Do not strain if you are constipated  You may faint if you strain to have a bowel movement  Walking is the best way to get your bowels moving  Eat foods high in fiber to make it easier to have a bowel movement  Good examples are high-fiber cereals, beans, vegetables, and whole-grain breads  Prune juice may help make bowel movements softer  Be careful in hot weather  Heat can cause a syncope episode  Limit activity done outside on hot days  Physical activity in hot weather can lead to dehydration  This can cause an episode  © Copyright [employer] 2021 Information is for End User's use only and may not be sold, redistributed or otherwise used for commercial purposes  All illustrations and images included in CareNotes® are the copyrighted property of [employer]  or [facility] Isaac Hensley   The above information is an  only  It is not intended as medical advice for individual conditions or treatments  Talk to your doctor, nurse or pharmacist before following any medical regimen to see if it is safe and effective for you

## 2021-09-23 ENCOUNTER — OFFICE VISIT (OUTPATIENT)
Dept: CARDIOLOGY CLINIC | Facility: MEDICAL CENTER | Age: 26
End: 2021-09-23
Payer: COMMERCIAL

## 2021-09-23 VITALS
OXYGEN SATURATION: 98 % | SYSTOLIC BLOOD PRESSURE: 96 MMHG | HEART RATE: 82 BPM | BODY MASS INDEX: 23.28 KG/M2 | WEIGHT: 131.4 LBS | DIASTOLIC BLOOD PRESSURE: 60 MMHG

## 2021-09-23 DIAGNOSIS — R55 NEAR SYNCOPE: Primary | ICD-10-CM

## 2021-09-23 DIAGNOSIS — R01.1 CARDIAC MURMUR: ICD-10-CM

## 2021-09-23 PROCEDURE — 99204 OFFICE O/P NEW MOD 45 MIN: CPT | Performed by: INTERNAL MEDICINE

## 2021-09-23 PROCEDURE — 93000 ELECTROCARDIOGRAM COMPLETE: CPT | Performed by: INTERNAL MEDICINE

## 2021-09-23 RX ORDER — AMOXICILLIN 500 MG/1
CAPSULE ORAL
COMMUNITY
Start: 2021-09-21 | End: 2022-03-15

## 2021-09-23 NOTE — PATIENT INSTRUCTIONS
ECHO planned to evaluate heart function and rule out significant valvular heart disease  Nonpharmacologic measures for management of  Positional lightheadedness and near fainting:       Avoidance of prolonged standing and dehydration  Lower extremity exercises advised  Avoid excessive heat exposure

## 2021-09-23 NOTE — ASSESSMENT & PLAN NOTE
Grade 3 systolic cardiac murmur, best heard in the  Standing up position  Echocardiogram planned to evaluate for valvular heart disease and rule out hypertrophic cardiomyopathy

## 2021-09-23 NOTE — LETTER
2021     Leah Carranza, [address] [place] [address] Arsalan Colon    Patient: Tomas Díaz   YOB: 1995   Date of Visit: 2021       Dear Dr Georgiana Walker:    Thank you for referring Tomas Díaz to me for evaluation  Below are my notes for this consultation  If you have questions, please do not hesitate to call me  I look forward to following your patient along with you  Sincerely,        Vesta Chaidez MD        CC: No Recipients  Vesta Chaidez MD  2021 11:44 AM  Sign when Signing Visit   [address] [address]  Phone#  [phone]  Fax#  [phone] [address] Cardiology Office Consultation             NAME: Tomas Díaz  AGE: 23 y o  SEX: female   : 1995   MRN: [id number]    DATE: 2021  TIME: 11:43 AM    Assessment and plan:    1  Near syncope  Assessment & Plan:    Recent episodes of near-syncope  Low likelihood of arrhythmic etiology  Nonpharmacologic measures to prevent orthostatic dizziness including staying hydrated, liberal salt intake, lower extremity exercises discussed  If blood pressure remains low, compression stockings also may be helpful  Orders:  -     POCT ECG  -     Echo complete with contrast if indicated; Future; Expected date: 2021    2  Cardiac murmur  Assessment & Plan:    Grade 3 systolic cardiac murmur, best heard in the  Standing up position  Echocardiogram planned to evaluate for valvular heart disease and rule out hypertrophic cardiomyopathy  Orders:  -     Echo complete with contrast if indicated; Future; Expected date: 2021         Chief Complaint   Patient presents with    New Patient Visit     pt visited ER for dizziness and a heart murmur was found       HPI:    Tomas Díaz is a 23y o -year-old female who presents to the cardiology clinic for evaluation      She had ER visit on the  of this year with feeling lightheaded and 2 episodes of nearly passing out  First episode, she was sitting eating breakfast and felt lightheaded and got presyncopal   Second episode happened when she was walking, felt hot, sweaty and lightheaded  Throughout the day she felt lightheaded and not herself  No rishi syncope  ER workup unremarkable  Reported some palpitations , only when she is anxious but this does not result in any dizziness or lightheadedness  She was told in the emergency room that she had a cardiac murmur and that further cardiac evaluation was indicated  She states that at age 4, she was referred to a cardiologist for a cardiac murmur and was told that she would outgrow her murmur  Current symptoms:   No further episodes of   Near syncope  She does report that when she gets up too fast after sitting for a while, she gets lightheaded  No palpitations reported with this  No family history of sudden death, hypertrophic cardiomyopathy or cardiac arrhythmias  Past history, family history, social history, current medications, vital signs, recent lab and imaging studies reviewed independently on this visit  Cardiology Problem list:  Presyncope:  8/21: CT head negative  Palpitations    BP Readings from Last 4 Encounters:   09/23/21 96/60   08/01/21 157/82   06/20/20 132/70   07/28/19 156/73      Wt Readings from Last 3 Encounters:   09/23/21 59 6 kg (131 lb 6 4 oz)   08/01/21 59 8 kg (131 lb 13 4 oz)   06/20/20 65 5 kg (144 lb 6 4 oz)       No results found for: [address]  Lab Results   Component Value Date    CREATININE 0 71 08/01/2021          ALLERGIES:  Allergies   Allergen Reactions    Augmentin [Amoxicillin-Pot Clavulanate]          Current Outpatient Medications:     amoxicillin (AMOXIL) 500 mg capsule, TAKE 1 CAPSULE BY MOUTH THREE TIMES DAILY UNTIL FINISHED, Disp: , Rfl:       Review of Systems    History reviewed  No pertinent past medical history      Past Surgical History:   Procedure Laterality Date    TONSILLECTOMY         Family History   Problem Relation Age of Onset    Hypertension Maternal Grandmother     Hyperlipidemia Maternal Grandmother     Cancer Maternal Grandmother     Prostate cancer Other     Colon cancer Other        Social History     Socioeconomic History    Marital status: /Civil Union     Spouse name: Not on file    Number of children: Not on file    Years of education: Not on file    Highest education level: Not on file   Occupational History    Not on file   Tobacco Use    Smoking status: Former Smoker     Years: 5 00     Quit date: 6/20/2018     Years since quitting: 3 2    Smokeless tobacco: Never Used   Vaping Use    Vaping Use: Some days    Substances: Nicotine   Substance and Sexual Activity    Alcohol use: Yes    Drug use: No     Types: Marijuana     Comment: history of    Sexual activity: Not on file   Other Topics Concern    Not on file   Social History Narrative    Not on file     Social Determinants of Health     Financial Resource Strain:     Difficulty of Paying Living Expenses:    Food Insecurity:     Worried About Running Out of Food in the Last Year:     [address] in the Last Year:    Transportation Needs:     Lack of Transportation (Medical):      Lack of Transportation (Non-Medical):    Physical Activity:     Days of Exercise per Week:     Minutes of Exercise per Session:    Stress:     Feeling of Stress :    Social Connections:     Frequency of Communication with Friends and Family:     Frequency of Social Gatherings with Friends and Family:     Attends Mandaeism Services:     Active Member of Clubs or Organizations:     Attends Club or Organization Meetings:     Marital Status:    Intimate Partner Violence:     Fear of Current or Ex-Partner:     Emotionally Abused:     Physically Abused:     Sexually Abused:          Objective:     Vitals:    09/23/21 1113   BP: 96/60   Pulse: 82   SpO2: 98%     Wt Readings from Last 3 Encounters:   09/23/21 59 6 kg (131 lb 6 4 oz)   08/01/21 59 8 kg (131 lb 13 4 oz)   06/20/20 65 5 kg (144 lb 6 4 oz)     Pulse Readings from Last 3 Encounters:   09/23/21 82   08/01/21 86   06/20/20 78     BP Readings from Last 3 Encounters:   09/23/21 96/60   08/01/21 157/82   06/20/20 132/70         Physical Exam  Constitutional:       General: She is not in acute distress  HENT:      Head: Normocephalic  Eyes:      Conjunctiva/sclera: Conjunctivae normal    Neck:      Vascular: No carotid bruit  Cardiovascular:      Rate and Rhythm: Normal rate and regular rhythm  Heart sounds: S1 normal and S2 normal  No murmur heard  Pulmonary:      Effort: Pulmonary effort is normal       Breath sounds: Normal breath sounds  Musculoskeletal:      Right lower leg: No edema  Left lower leg: No edema  Skin:     General: Skin is warm  Neurological:      General: No focal deficit present  Psychiatric:         Mood and Affect: Mood normal          Pertinent Laboratory/Diagnostic Studies:    Laboratory studies reviewed personally by Panchito Ayala MD    BMP:   Lab Results   Component Value Date    SODIUM 139 08/01/2021    K 3 8 08/01/2021     08/01/2021    CO2 26 08/01/2021    BUN 10 08/01/2021    CREATININE 0 71 08/01/2021    GLUC 87 08/01/2021    CALCIUM 8 9 08/01/2021     CBC:  Lab Results   Component Value Date    WBC 9 46 08/01/2021    RBC 4 79 08/01/2021    HGB 14 1 08/01/2021    HCT 42 0 08/01/2021    MCV 88 08/01/2021    MCH 29 4 08/01/2021    RDW 12 1 08/01/2021     08/01/2021     Coags:    Lipid Profile:   No results found for: CHOL  No results found for: HDL  No results found for: LDLCALC  No results found for: TRIG   Lab Results   Component Value Date    XQC3ALTXHGVH 1 429 08/01/2021     Lab Results   Component Value Date    ALT 17 08/01/2021    AST 15 08/01/2021         Imaging Studies:   No results found      Cardiac testing:   EKG reviewed personally: normal sinus rhythm, RAD     No results found for this or any previous visit  Orders Placed This Encounter   Procedures    POCT ECG    Echo complete with contrast if indicated           Percy Gaines MD, [facility] of the record may have been created with voice recognition software  Occasional wrong word or "sound a like" substitutions may have occurred due to the inherent limitations of voice recognition software  Read the chart carefully and recognize, using context, where substitutions have occurred  If you have any questions or concerns about the context, text or information contained within the body of this dictation, please contact myself, the provider, for further clarification

## 2021-09-23 NOTE — ASSESSMENT & PLAN NOTE
Recent episodes of near-syncope  Low likelihood of arrhythmic etiology  Nonpharmacologic measures to prevent orthostatic dizziness including staying hydrated, liberal salt intake, lower extremity exercises discussed  If blood pressure remains low, compression stockings also may be helpful

## 2022-01-07 ENCOUNTER — HOSPITAL ENCOUNTER (OUTPATIENT)
Dept: NON INVASIVE DIAGNOSTICS | Facility: CLINIC | Age: 27
Discharge: HOME | End: 2022-01-07
Payer: COMMERCIAL

## 2022-01-07 VITALS
SYSTOLIC BLOOD PRESSURE: 96 MMHG | HEIGHT: 63 IN | HEART RATE: 66 BPM | BODY MASS INDEX: 23.21 KG/M2 | WEIGHT: 131 LBS | DIASTOLIC BLOOD PRESSURE: 60 MMHG

## 2022-01-07 DIAGNOSIS — R01.1 CARDIAC MURMUR: ICD-10-CM

## 2022-01-07 DIAGNOSIS — R55 NEAR SYNCOPE: ICD-10-CM

## 2022-01-07 LAB
AORTIC ROOT: 2.6 CM
APICAL FOUR CHAMBER EJECTION FRACTION: 65 %
E WAVE DECELERATION TIME: 162 MS
FRACTIONAL SHORTENING: 38 % (ref 28–44)
INTERVENTRICULAR SEPTUM IN DIASTOLE (PARASTERNAL SHORT AXIS VIEW): 0.6 CM
LEFT ATRIUM AREA SYSTOLE SINGLE PLANE A4C: 13.3 CM2
LEFT INTERNAL DIMENSION IN SYSTOLE: 2.8 CM (ref 2.1–4)
LEFT VENTRICULAR INTERNAL DIMENSION IN DIASTOLE: 4.5 CM (ref 3.77–5.61)
LEFT VENTRICULAR POSTERIOR WALL IN END DIASTOLE: 0.6 CM
LEFT VENTRICULAR STROKE VOLUME: 62 ML
MV E'TISSUE VEL-SEP: 12 CM/S
MV PEAK A VEL: 0.36 M/S
MV PEAK E VEL: 88 CM/S
MV STENOSIS PRESSURE HALF TIME: 0 MS
RIGHT ATRIUM AREA SYSTOLE A4C: 10.4 CM2
RIGHT VENTRICLE ID DIMENSION: 2.8 CM
SL CV LV EF: 55
SL CV PED ECHO LEFT VENTRICLE DIASTOLIC VOLUME (MOD BIPLANE) 2D: 91 ML
SL CV PED ECHO LEFT VENTRICLE SYSTOLIC VOLUME (MOD BIPLANE) 2D: 30 ML
TRICUSPID VALVE PEAK REGURGITATION VELOCITY: 1.91 M/S
TRICUSPID VALVE S': 0.9 CM/S
TV PEAK GRADIENT: 15 MMHG
Z-SCORE OF LEFT VENTRICULAR DIMENSION IN END SYSTOLE: -0.22

## 2022-01-07 PROCEDURE — 93306 TTE W/DOPPLER COMPLETE: CPT

## 2022-01-07 PROCEDURE — 93306 TTE W/DOPPLER COMPLETE: CPT | Performed by: INTERNAL MEDICINE

## 2022-01-07 NOTE — RESULT ENCOUNTER NOTE
ECHO reviewed:   Normal pumping strength of the heart muscle  Valves: No serious abnormalities seen  No evidence of hypertrophic cardiomyopathy  Overall these test results are reassuring  Please call patient with test results

## 2022-03-09 ENCOUNTER — RA CDI HCC (OUTPATIENT)
Dept: OTHER | Facility: HOSPITAL | Age: 27
End: 2022-03-09

## 2022-03-09 NOTE — PROGRESS NOTES
[facility]  coding opportunities       Chart reviewed, no opportunity found: CHART REVIEWED, NO OPPORTUNITY FOUND                        Patients insurance company: [employer] (Medicare Advantage and Commercial)

## 2022-03-15 ENCOUNTER — OFFICE VISIT (OUTPATIENT)
Dept: FAMILY MEDICINE CLINIC | Facility: CLINIC | Age: 27
End: 2022-03-15
Payer: COMMERCIAL

## 2022-03-15 VITALS
TEMPERATURE: 97.9 F | SYSTOLIC BLOOD PRESSURE: 90 MMHG | WEIGHT: 109.8 LBS | RESPIRATION RATE: 12 BRPM | HEIGHT: 62 IN | HEART RATE: 80 BPM | OXYGEN SATURATION: 99 % | BODY MASS INDEX: 20.2 KG/M2 | DIASTOLIC BLOOD PRESSURE: 60 MMHG

## 2022-03-15 DIAGNOSIS — Z13.220 LIPID SCREENING: ICD-10-CM

## 2022-03-15 DIAGNOSIS — Z11.59 NEED FOR HEPATITIS C SCREENING TEST: ICD-10-CM

## 2022-03-15 DIAGNOSIS — R63.4 WEIGHT LOSS: ICD-10-CM

## 2022-03-15 DIAGNOSIS — F33.0 MDD (MAJOR DEPRESSIVE DISORDER), RECURRENT EPISODE, MILD (HCC): ICD-10-CM

## 2022-03-15 DIAGNOSIS — Z00.00 WELL ADULT EXAM: Primary | ICD-10-CM

## 2022-03-15 DIAGNOSIS — Z29.9 PREVENTIVE MEASURE: ICD-10-CM

## 2022-03-15 DIAGNOSIS — F41.9 ANXIETY: ICD-10-CM

## 2022-03-15 DIAGNOSIS — G43.109 MIGRAINE WITH AURA AND WITHOUT STATUS MIGRAINOSUS, NOT INTRACTABLE: ICD-10-CM

## 2022-03-15 DIAGNOSIS — Z11.4 SCREENING FOR HIV (HUMAN IMMUNODEFICIENCY VIRUS): ICD-10-CM

## 2022-03-15 PROCEDURE — 3725F SCREEN DEPRESSION PERFORMED: CPT | Performed by: FAMILY MEDICINE

## 2022-03-15 PROCEDURE — 99385 PREV VISIT NEW AGE 18-39: CPT | Performed by: FAMILY MEDICINE

## 2022-03-15 PROCEDURE — 1036F TOBACCO NON-USER: CPT | Performed by: FAMILY MEDICINE

## 2022-03-15 PROCEDURE — 3008F BODY MASS INDEX DOCD: CPT | Performed by: FAMILY MEDICINE

## 2022-03-15 PROCEDURE — 99203 OFFICE O/P NEW LOW 30 MIN: CPT | Performed by: FAMILY MEDICINE

## 2022-03-15 RX ORDER — SUMATRIPTAN 50 MG/1
50 TABLET, FILM COATED ORAL ONCE AS NEEDED
Qty: 9 TABLET | Refills: 0 | Status: SHIPPED | OUTPATIENT
Start: 2022-03-15 | End: 2022-05-24

## 2022-03-15 RX ORDER — PROCHLORPERAZINE MALEATE 10 MG
10 TABLET ORAL EVERY 6 HOURS PRN
Qty: 30 TABLET | Refills: 0 | Status: SHIPPED | OUTPATIENT
Start: 2022-03-15 | End: 2022-05-24

## 2022-03-15 NOTE — PATIENT INSTRUCTIONS
For headache and migraine prevention I would suggest a combination vitamin supplement which may include 1 or more of the following ingredients:  Magnesium 400 mg , Riboflavin (vitamin B2) 400 - 600 mg,  Butterbur 150 mg (PA free)  Other ingredients that may be helpful are feverfew, coenzyme Q10 100 mg three times a day,  melatonin and giles  Some example brands that combine some of these ingredients are Migravent or [employer]  Dentists   [address]  [address]  [place] Blow, [address] [facility]  [phone]    Rafaelakathleen Mahmood 94  [address] [address] [facility], [place] Blow, [address] [facility]  [phone]    Zeeshan Lopez   [address], [address] (treats tongue tied also)   [phone]     Oly Velazquez Kil [address]um   [address] [facility], [address] [facility] [address]  [address], [place], [place] [address]  [phone]    [place] (dental clinic)  [address] [facility], [address] [facility], [place], [address] [facility] [facility]  [phone]    [facility]   [address], [place], [address]  [address] , [address], [place], [address], DMD  [facility], [address]  [facility], [address]  [address] [facility], [address] [facility], [address] [facility], [facility], [address]  [address] [facility] ([address])   [address] [place] [address]   [id number] Problem: Patient Care Overview  Goal: Plan of Care Review  Outcome: Outcome(s) achieved Date Met: 12/03/18 12/03/18 0942   Coping/Psychosocial   Plan of Care Reviewed With patient   Plan of Care Review   Progress no change   OTHER   Outcome Summary OT screening completed. Pt known to this OT, pt discussed status since last admission and new health concerns with OT. Pt reports being up ad jorge in the room completing ADL and in [place] walking independently. Pt has no concerns requiring full OT eval at this time. Please reconsult if change in medical status. Anticipate d/c home w assist.
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
## 2022-05-18 ENCOUNTER — OFFICE VISIT (OUTPATIENT)
Dept: OBGYN CLINIC | Facility: CLINIC | Age: 27
End: 2022-05-18
Payer: COMMERCIAL

## 2022-05-18 VITALS
SYSTOLIC BLOOD PRESSURE: 104 MMHG | DIASTOLIC BLOOD PRESSURE: 60 MMHG | WEIGHT: 109.6 LBS | HEIGHT: 61 IN | BODY MASS INDEX: 20.69 KG/M2

## 2022-05-18 DIAGNOSIS — Z12.4 ENCOUNTER FOR SCREENING FOR MALIGNANT NEOPLASM OF CERVIX: Primary | ICD-10-CM

## 2022-05-18 DIAGNOSIS — Z01.419 WELL WOMAN EXAM WITH ROUTINE GYNECOLOGICAL EXAM: ICD-10-CM

## 2022-05-18 DIAGNOSIS — N91.2 AMENORRHEA: ICD-10-CM

## 2022-05-18 DIAGNOSIS — Z29.9 PREVENTIVE MEASURE: ICD-10-CM

## 2022-05-18 PROCEDURE — G0145 SCR C/V CYTO,THINLAYER,RESCR: HCPCS | Performed by: OBSTETRICS & GYNECOLOGY

## 2022-05-18 PROCEDURE — 99385 PREV VISIT NEW AGE 18-39: CPT | Performed by: OBSTETRICS & GYNECOLOGY

## 2022-05-18 NOTE — PROGRESS NOTES
ASSESSMENT & PLAN:       The following were reviewed in today's visit: ASCCP guidelines for pap testing,  breast self exam and family planning choices  All questions have been answered to her satisfaction  Patient to return to office yearly for annual exam      Discussed contraceptive options including long are short acting contraception  Encouraged progestin-only given hx of migraines  Provided with information on nexplanon and Layo Shields IUD  Pt  Will call/ message with decision or questions  CC:  Annual Gynecologic Examination    HPI: Briana Hernandez is a 23 y o  [employer] Dial who presents for annual gynecologic examination  She has the following concerns:  Would like to discuss contraceptive options  Does have a significant hx of migraine with aura  Has never used contraception in the past       Health Maintenance:     Recommend at least 150 minutes a week of moderate intensity activity such as brisk walking and at least 2 days a week of activities that strengthen muscles  She wears her seatbelt routinely  She does perform self breast exams  Past Medical History:   Diagnosis Date    Anxiety     Depression     Headache     Heart murmur     Migraine        Past Surgical History:   Procedure Laterality Date    TONSILLECTOMY         Past OB/Gyn History:    Menstrual History:  OB History        0    Para   0    Term   0       0    AB   0    Living   0       SAB   0    IAB   0    Ectopic   0    Multiple   0    Live Births   0                Patient's last menstrual period was 2022  Period Cycle (Days): 28  Period Duration (Days): 5-6  Period Pattern: Regular  Menstrual Flow: Heavy    History of sexually transmitted infection No  Patient is not interested in STI testing today  Patient is currently sexually active  Bisexual; currently with male partner  Birth control: none    Last Pap  none :   Gardisil vaccination:    Family History   Problem Relation Age of Onset    Other Mother         KIDNEY MASS BIOPSY NO CANCER     Anxiety disorder Father     Bipolar disorder Father     Suicide Attempts Father     Alcohol abuse Father     Anxiety disorder Sister     Bipolar disorder Sister     Depression Sister     Anxiety disorder Brother         Celexa    Depression Brother     No Known Problems Maternal Grandmother     Leukemia Maternal Grandfather     No Known Problems Paternal Grandmother     Anuerysm Paternal Grandfather         BRAIN     Dementia Paternal Grandfather     Prostate cancer Other     Colon cancer Other     Hypertension Other     Hyperlipidemia Other        Social History:  Social History     Socioeconomic History    Marital status: /Civil Union     Spouse name: Not on file    Number of children: Not on file    Years of education: Not on file    Highest education level: Not on file   Occupational History    Not on file   Tobacco Use    Smoking status: Former Smoker     Packs/day: 0 00     Years: 5 00     Pack years: 0 00     Start date: 3/15/2014     Quit date: 6/20/2018     Years since quitting: 3 9    Smokeless tobacco: Never Used   Vaping Use    Vaping Use: Some days    Start date: 1/1/2018    Substances: Nicotine   Substance and Sexual Activity    Alcohol use: Yes     Alcohol/week: 1 0 standard drink     Types: 1 Cans of beer per week     Comment: Socially    Drug use: No     Types: Marijuana     Comment: history of    Sexual activity: Yes     Partners: Female, Male     Birth control/protection: None   Other Topics Concern    Not on file   Social History Narrative    Not on file     Social Determinants of Health     Financial Resource Strain: Not on file   Food Insecurity: Not on file   Transportation Needs: Not on file   Physical Activity: Not on file   Stress: Not on file   Social Connections: Not on file   Intimate Partner Violence: Not on file   Housing Stability: Not on file      She feels safe at home     Patient is single  Patient is currently employed works as an assistant and nanny    Allergies   Allergen Reactions    Augmentin [Amoxicillin-Pot Clavulanate]        Current Outpatient Medications:     prochlorperazine (COMPAZINE) 10 mg tablet, Take 1 tablet (10 mg total) by mouth every 6 (six) hours as needed for nausea or vomiting (Patient not taking: No sig reported), Disp: 30 tablet, Rfl: 0    SUMAtriptan (IMITREX) 50 mg tablet, Take 1 tablet (50 mg total) by mouth once as needed for migraine for up to 1 dose May repeat after one hour if needed  Max 200 mg in 2 hours  (Patient not taking: No sig reported), Disp: 9 tablet, Rfl: 0    Review of Systems:  Review of Systems   Constitutional: Negative for activity change, chills, fever and unexpected weight change  HENT: Negative for congestion, ear pain, hearing loss and sore throat  Respiratory: Negative for cough, chest tightness and shortness of breath  Cardiovascular: Negative for chest pain and leg swelling  Gastrointestinal: Negative for abdominal pain, constipation, diarrhea, nausea and vomiting  Genitourinary: Negative for difficulty urinating, dysuria, frequency, menstrual problem, pelvic pain, vaginal discharge and vaginal pain  Skin: Negative for color change and rash  Neurological: Negative for dizziness, numbness and headaches  Psychiatric/Behavioral: Negative for agitation and confusion  Physical Exam:  /60 (BP Location: Right arm, Patient Position: Sitting, Cuff Size: Standard)   Ht 5' 1" (1 549 m)   Wt 49 7 kg (109 lb 9 6 oz)   LMP 05/01/2022   BMI 20 71 kg/m²    Physical Exam  Constitutional:       General: She is not in acute distress  Appearance: Normal appearance  She is normal weight  Genitourinary:      Vulva, bladder, rectum and urethral meatus normal       No lesions in the vagina  Right Labia: No rash, tenderness or lesions  Left Labia: No tenderness, lesions or rash  No labial fusion noted  No vaginal discharge or tenderness  No vaginal prolapse present  No vaginal atrophy present  Right Adnexa: not tender, not full and no mass present  Left Adnexa: not tender, not full and no mass present  No cervical motion tenderness or friability  Uterus is not enlarged or prolapsed  Breasts:      Breasts are soft  Right: No inverted nipple, mass, nipple discharge or skin change  Left: No inverted nipple, mass, nipple discharge or skin change  HENT:      Head: Normocephalic and atraumatic  Nose: Nose normal    Eyes:      Conjunctiva/sclera: Conjunctivae normal       Pupils: Pupils are equal, round, and reactive to light  Cardiovascular:      Rate and Rhythm: Normal rate and regular rhythm  Pulses: Normal pulses  Heart sounds: Normal heart sounds  Pulmonary:      Effort: Pulmonary effort is normal  No respiratory distress  Breath sounds: Normal breath sounds  No wheezing  Abdominal:      General: Abdomen is flat  There is no distension  Palpations: Abdomen is soft  Tenderness: There is no abdominal tenderness  There is no guarding  Musculoskeletal:         General: Normal range of motion  Cervical back: Normal range of motion and neck supple  Neurological:      General: No focal deficit present  Mental Status: She is alert and oriented to person, place, and time  Mental status is at baseline  Skin:     General: Skin is warm and dry  Psychiatric:         Mood and Affect: Mood normal          Behavior: Behavior normal          Thought Content: Thought content normal          Judgment: Judgment normal    Vitals and nursing note reviewed

## 2022-05-24 ENCOUNTER — OFFICE VISIT (OUTPATIENT)
Dept: FAMILY MEDICINE CLINIC | Facility: CLINIC | Age: 27
End: 2022-05-24
Payer: COMMERCIAL

## 2022-05-24 ENCOUNTER — HOSPITAL ENCOUNTER (OUTPATIENT)
Dept: INFUSION CENTER | Facility: CLINIC | Age: 27
Discharge: HOME | End: 2022-05-24
Payer: COMMERCIAL

## 2022-05-24 ENCOUNTER — TELEPHONE (OUTPATIENT)
Dept: OTHER | Facility: OTHER | Age: 27
End: 2022-05-24

## 2022-05-24 VITALS
HEIGHT: 61 IN | WEIGHT: 110 LBS | SYSTOLIC BLOOD PRESSURE: 110 MMHG | OXYGEN SATURATION: 100 % | RESPIRATION RATE: 14 BRPM | HEART RATE: 98 BPM | TEMPERATURE: 98.3 F | BODY MASS INDEX: 20.77 KG/M2 | DIASTOLIC BLOOD PRESSURE: 78 MMHG

## 2022-05-24 DIAGNOSIS — A74.9 CHLAMYDIA: ICD-10-CM

## 2022-05-24 DIAGNOSIS — R10.2 PELVIC PRESSURE IN FEMALE: ICD-10-CM

## 2022-05-24 DIAGNOSIS — R50.9 FEVER, UNSPECIFIED FEVER CAUSE: Primary | ICD-10-CM

## 2022-05-24 DIAGNOSIS — R82.90 CLOUDY URINE: ICD-10-CM

## 2022-05-24 DIAGNOSIS — Z20.2 STD EXPOSURE: ICD-10-CM

## 2022-05-24 DIAGNOSIS — N73.0 PID (ACUTE PELVIC INFLAMMATORY DISEASE): ICD-10-CM

## 2022-05-24 LAB
SL AMB  POCT GLUCOSE, UA: NEGATIVE
SL AMB LEUKOCYTE ESTERASE,UA: NEGATIVE
SL AMB POCT BILIRUBIN,UA: NEGATIVE
SL AMB POCT BLOOD,UA: NEGATIVE
SL AMB POCT CLARITY,UA: CLEAR
SL AMB POCT COLOR,UA: ABNORMAL
SL AMB POCT KETONES,UA: NEGATIVE
SL AMB POCT NITRITE,UA: NEGATIVE
SL AMB POCT PH,UA: 6.5
SL AMB POCT SPECIFIC GRAVITY,UA: 1.01
SL AMB POCT URINE HCG: NEGATIVE
SL AMB POCT URINE PROTEIN: ABNORMAL
SL AMB POCT UROBILINOGEN: NEGATIVE

## 2022-05-24 PROCEDURE — 1036F TOBACCO NON-USER: CPT | Performed by: FAMILY MEDICINE

## 2022-05-24 PROCEDURE — 87591 N.GONORRHOEAE DNA AMP PROB: CPT | Performed by: FAMILY MEDICINE

## 2022-05-24 PROCEDURE — 87491 CHLMYD TRACH DNA AMP PROBE: CPT | Performed by: FAMILY MEDICINE

## 2022-05-24 PROCEDURE — 99215 OFFICE O/P EST HI 40 MIN: CPT | Performed by: FAMILY MEDICINE

## 2022-05-24 PROCEDURE — 81003 URINALYSIS AUTO W/O SCOPE: CPT | Performed by: FAMILY MEDICINE

## 2022-05-24 PROCEDURE — 3008F BODY MASS INDEX DOCD: CPT | Performed by: FAMILY MEDICINE

## 2022-05-24 PROCEDURE — 96372 THER/PROPH/DIAG INJ SC/IM: CPT

## 2022-05-24 PROCEDURE — 81025 URINE PREGNANCY TEST: CPT | Performed by: FAMILY MEDICINE

## 2022-05-24 RX ORDER — CEFTRIAXONE 500 MG/1
500 INJECTION, POWDER, FOR SOLUTION INTRAMUSCULAR; INTRAVENOUS ONCE
Status: DISCONTINUED | OUTPATIENT
Start: 2022-05-24 | End: 2022-05-24

## 2022-05-24 RX ORDER — METRONIDAZOLE 500 MG/1
500 TABLET ORAL 2 TIMES DAILY
Qty: 28 TABLET | Refills: 0 | Status: SHIPPED | OUTPATIENT
Start: 2022-05-24 | End: 2022-06-07

## 2022-05-24 RX ORDER — DOXYCYCLINE HYCLATE 100 MG
100 TABLET ORAL 2 TIMES DAILY
Qty: 28 TABLET | Refills: 0 | Status: SHIPPED | OUTPATIENT
Start: 2022-05-24 | End: 2022-06-07

## 2022-05-24 RX ORDER — CEFTRIAXONE 500 MG/1
500 INJECTION, POWDER, FOR SOLUTION INTRAMUSCULAR; INTRAVENOUS ONCE
Qty: 1 EACH | Refills: 0 | Status: SHIPPED | OUTPATIENT
Start: 2022-05-24 | End: 2022-05-24

## 2022-05-24 RX ADMIN — CEFTRIAXONE 500 MG: 1 INJECTION, POWDER, FOR SOLUTION INTRAMUSCULAR; INTRAVENOUS at 12:44

## 2022-05-24 NOTE — PROGRESS NOTES
Assessment/Plan:   Audra Lawson was seen today for urinary tract infection  Diagnoses and all orders for this visit:    Fever, unspecified fever cause  -     POCT urine dip auto non-scope    Cloudy urine  -     POCT urine dip auto non-scope    Pelvic pressure in female  -     POCT urine dip auto non-scope  -     Discontinue: cefTRIAXone (ROCEPHIN) injection 500 mg  -     VAGINOSIS DNA PROBE (AFFIRM); Future  -     Chlamydia/GC amplified DNA by PCR; Future  -     VAGINOSIS DNA PROBE (AFFIRM)  -     Chlamydia/GC amplified DNA by PCR  -     cefTRIAXone (ROCEPHIN) 500 mg injection; Inject 500 mg into a muscle 1 (one) time for 1 dose  -     POCT urine HCG    PID (acute pelvic inflammatory disease)  -     metroNIDAZOLE (FLAGYL) 500 mg tablet; Take 1 tablet (500 mg total) by mouth in the morning and 1 tablet (500 mg total) in the evening  Do all this for 14 days  -     doxycycline hyclate (VIBRA-TABS) 100 mg tablet; Take 1 tablet (100 mg total) by mouth in the morning and 1 tablet (100 mg total) in the evening  Do all this for 14 days  -     VAGINOSIS DNA PROBE (AFFIRM); Future  -     Chlamydia/GC amplified DNA by PCR; Future  -     VAGINOSIS DNA PROBE (AFFIRM)  -     Chlamydia/GC amplified DNA by PCR  differential dx reviewed with pt  UA and history no c/w UTI  Exam consistent with PID  Cultures taken  Treated with flagyl, doxy twice a day x 14 days  Sent for Rocephin IM today at [facility] - see below  Reviewed possible causes  When results back will call pt to review these with her and review plan for follow up  Until then advised no sexual activity  5 minutes spent on chart prep, 40 minutes spent with patient counseling/educating on their diagnoses, tests completed and any new tests ordered, any referrals placed, treatment options, and documentation of above today     In prescribing new medications, or changing doses, we reviewed the risks and benefits and side effects of these medications along with other treatment options if appropriate  Time spent examining patient  We did not have Rocephin in our office  Called to ER to try to get Rocephin IM injection for her there  After speaking to nurse and then Dr they advised they were unable to give this would evaluating patient  Recommended we speak to infusion center  They were then called  Orders were placed, then printed, then written on paper order for infusion  Explained all of this to the patient who spent about an hour total in the office  Subjective:    HPI  Deepak Brown is a 23 y o  female who presents with:  Chief Complaint     Urinary Tract Infection        HPI     Urinary Tract Infection      Additional comments: Fever last night, lower back pain, pelvic pain, urine cloudy  Odor  Denies urgency and frequency  Last edited by Gino Sadler LPN on 6/59/8608  1:73 AM  (History)        ---Above per clinical staff & reviewed  ---        Today:  Some discomfort in her abdominal area last night   Fever last night   Work up this am   Urine odor   Bladder area and lower back pain   Divorce   Male partner   Condoms  Appointment to start birth control   No itching, burning or pain   No frequency, urgency, dysuria   No blood in urine  Period due in one week - due June 1  2 weeks was first intercourse - last on Sunday   101 yesterday   No COVID symptoms   No rashes or joint pains   Spot under armpit     The following portions of the patient's history were reviewed and updated as appropriate: allergies, current medications, past family history, past medical history, past social history, past surgical history and problem list   Review of Systems  ROS:  all others negative - no chest pain, SOB, normal urine and bowels  no GERD  sleeping well  mood good   + fever + abdominal pain     Objective:    /78   Pulse 98   Temp 98 3 °F (36 8 °C) (Temporal)   Resp 14   Ht 5' 1" (1 549 m)   Wt 49 9 kg (110 lb)   LMP 05/01/2022   SpO2 100%   BMI 20 78 kg/m²   Wt Readings from Last 3 Encounters:   05/24/22 49 9 kg (110 lb)   05/18/22 49 7 kg (109 lb 9 6 oz)   03/15/22 49 8 kg (109 lb 12 8 oz)     BP Readings from Last 3 Encounters:   05/24/22 110/78   05/18/22 104/60   03/15/22 90/60       Current Medications:  Current Outpatient Medications   Medication Sig Dispense Refill    cefTRIAXone (ROCEPHIN) 500 mg injection Inject 500 mg into a muscle 1 (one) time for 1 dose 1 each 0    doxycycline hyclate (VIBRA-TABS) 100 mg tablet Take 1 tablet (100 mg total) by mouth in the morning and 1 tablet (100 mg total) in the evening  Do all this for 14 days  28 tablet 0    metroNIDAZOLE (FLAGYL) 500 mg tablet Take 1 tablet (500 mg total) by mouth in the morning and 1 tablet (500 mg total) in the evening  Do all this for 14 days  28 tablet 0     No current facility-administered medications for this visit  Physical Exam  Abdominal:      Tenderness: There is abdominal tenderness in the suprapubic area and left lower quadrant  There is guarding  Genitourinary:     Cervix: Cervical motion tenderness and discharge (yellow, frothy, mild to moderate) present  Uterus: Tender  Constitutional: she appears well-developed and well-nourished  HENT: Head: Normocephalic  Neck: No pain on exam  Neck supple  Cardiovascular: Normal rate, regular rhythm and normal heart sounds  Pulmonary/Chest: Effort normal and breath sounds normal    Abdominal: Soft  Bowel sounds are normal  There is + tenderness  Mild right CVA tenderness  Left lower quadrant tenderness  + suprapubic tenderness with rebound    + CMT  + vaginal d/c  Lymphadenopathy: she has no cervical adenopathy  Neurological: she is alert and oriented to person, place, and time  Skin: Skin is warm and dry  Psychiatric: she has a normal mood and affect   her behavior is normal

## 2022-05-25 LAB
C TRACH DNA SPEC QL NAA+PROBE: POSITIVE
LAB AP GYN PRIMARY INTERPRETATION: NORMAL
Lab: NORMAL
N GONORRHOEA DNA SPEC QL NAA+PROBE: NEGATIVE

## 2022-05-26 LAB
CANDIDA RRNA VAG QL PROBE: NEGATIVE
G VAGINALIS RRNA GENITAL QL PROBE: POSITIVE
T VAGINALIS RRNA GENITAL QL PROBE: NEGATIVE

## 2022-05-26 NOTE — RESULT ENCOUNTER NOTE
Spoke to pt  Pain not as bad  No fevers for 1 5 days  Taking meds  Understands she needs to be retested in 3 months   She agrees to STI blood work testing    - please mail pt chlamydia urine test only to be done in 3 months

## 2022-07-12 ENCOUNTER — APPOINTMENT (OUTPATIENT)
Dept: LAB | Facility: CLINIC | Age: 27
End: 2022-07-12
Payer: COMMERCIAL

## 2022-07-12 DIAGNOSIS — Z20.2 STD EXPOSURE: ICD-10-CM

## 2022-07-12 DIAGNOSIS — G43.109 MIGRAINE WITH AURA AND WITHOUT STATUS MIGRAINOSUS, NOT INTRACTABLE: ICD-10-CM

## 2022-07-12 DIAGNOSIS — A74.9 CHLAMYDIA: ICD-10-CM

## 2022-07-12 DIAGNOSIS — R63.4 WEIGHT LOSS: ICD-10-CM

## 2022-07-12 DIAGNOSIS — Z13.220 LIPID SCREENING: ICD-10-CM

## 2022-07-12 DIAGNOSIS — Z11.59 NEED FOR HEPATITIS C SCREENING TEST: ICD-10-CM

## 2022-07-12 LAB
ALBUMIN SERPL BCP-MCNC: 4.2 G/DL (ref 3.5–5)
ALP SERPL-CCNC: 55 U/L (ref 34–104)
ALT SERPL W P-5'-P-CCNC: 11 U/L (ref 7–52)
ANION GAP SERPL CALCULATED.3IONS-SCNC: 6 MMOL/L (ref 4–13)
AST SERPL W P-5'-P-CCNC: 14 U/L (ref 13–39)
BASOPHILS # BLD AUTO: 0.1 THOUSANDS/ΜL (ref 0–0.1)
BASOPHILS NFR BLD AUTO: 1 % (ref 0–1)
BILIRUB SERPL-MCNC: 0.7 MG/DL (ref 0.2–1)
BUN SERPL-MCNC: 11 MG/DL (ref 5–25)
CALCIUM SERPL-MCNC: 9.2 MG/DL (ref 8.4–10.2)
CHLORIDE SERPL-SCNC: 106 MMOL/L (ref 96–108)
CHOLEST SERPL-MCNC: 172 MG/DL
CO2 SERPL-SCNC: 27 MMOL/L (ref 21–32)
CREAT SERPL-MCNC: 0.84 MG/DL (ref 0.6–1.3)
EOSINOPHIL # BLD AUTO: 0.31 THOUSAND/ΜL (ref 0–0.61)
EOSINOPHIL NFR BLD AUTO: 3 % (ref 0–6)
ERYTHROCYTE [DISTWIDTH] IN BLOOD BY AUTOMATED COUNT: 13.2 % (ref 11.6–15.1)
GFR SERPL CREATININE-BSD FRML MDRD: 96 ML/MIN/1.73SQ M
GLUCOSE P FAST SERPL-MCNC: 85 MG/DL (ref 65–99)
HAV IGM SER QL: NORMAL
HBV CORE IGM SER QL: NORMAL
HBV SURFACE AG SER QL: NORMAL
HCT VFR BLD AUTO: 44.3 % (ref 34.8–46.1)
HCV AB SER QL: NORMAL
HDLC SERPL-MCNC: 92 MG/DL
HGB BLD-MCNC: 14.7 G/DL (ref 11.5–15.4)
IMM GRANULOCYTES # BLD AUTO: 0.05 THOUSAND/UL (ref 0–0.2)
IMM GRANULOCYTES NFR BLD AUTO: 1 % (ref 0–2)
LDLC SERPL CALC-MCNC: 60 MG/DL (ref 0–100)
LYMPHOCYTES # BLD AUTO: 3.13 THOUSANDS/ΜL (ref 0.6–4.47)
LYMPHOCYTES NFR BLD AUTO: 32 % (ref 14–44)
MCH RBC QN AUTO: 30.8 PG (ref 26.8–34.3)
MCHC RBC AUTO-ENTMCNC: 33.2 G/DL (ref 31.4–37.4)
MCV RBC AUTO: 93 FL (ref 82–98)
MONOCYTES # BLD AUTO: 0.64 THOUSAND/ΜL (ref 0.17–1.22)
MONOCYTES NFR BLD AUTO: 7 % (ref 4–12)
NEUTROPHILS # BLD AUTO: 5.48 THOUSANDS/ΜL (ref 1.85–7.62)
NEUTS SEG NFR BLD AUTO: 56 % (ref 43–75)
NONHDLC SERPL-MCNC: 80 MG/DL
NRBC BLD AUTO-RTO: 0 /100 WBCS
PLATELET # BLD AUTO: 301 THOUSANDS/UL (ref 149–390)
PMV BLD AUTO: 9.3 FL (ref 8.9–12.7)
POTASSIUM SERPL-SCNC: 4.2 MMOL/L (ref 3.5–5.3)
PROT SERPL-MCNC: 7.1 G/DL (ref 6.4–8.4)
RBC # BLD AUTO: 4.78 MILLION/UL (ref 3.81–5.12)
RPR SER QL: NORMAL
SODIUM SERPL-SCNC: 139 MMOL/L (ref 135–147)
TRIGL SERPL-MCNC: 99 MG/DL
TSH SERPL DL<=0.05 MIU/L-ACNC: 1.62 UIU/ML (ref 0.45–4.5)
WBC # BLD AUTO: 9.71 THOUSAND/UL (ref 4.31–10.16)

## 2022-07-12 PROCEDURE — 86592 SYPHILIS TEST NON-TREP QUAL: CPT

## 2022-07-12 PROCEDURE — 80061 LIPID PANEL: CPT

## 2022-07-12 PROCEDURE — 80074 ACUTE HEPATITIS PANEL: CPT

## 2022-07-12 PROCEDURE — 85025 COMPLETE CBC W/AUTO DIFF WBC: CPT

## 2022-07-12 PROCEDURE — 36415 COLL VENOUS BLD VENIPUNCTURE: CPT

## 2022-07-12 PROCEDURE — 80053 COMPREHEN METABOLIC PANEL: CPT

## 2022-07-12 PROCEDURE — 84443 ASSAY THYROID STIM HORMONE: CPT

## 2022-07-12 PROCEDURE — 87389 HIV-1 AG W/HIV-1&-2 AB AG IA: CPT

## 2022-07-13 ENCOUNTER — APPOINTMENT (EMERGENCY)
Dept: CT IMAGING | Facility: HOSPITAL | Age: 27
End: 2022-07-13
Payer: COMMERCIAL

## 2022-07-13 ENCOUNTER — HOSPITAL ENCOUNTER (EMERGENCY)
Facility: HOSPITAL | Age: 27
Discharge: HOME | End: 2022-07-13
Attending: EMERGENCY MEDICINE
Payer: COMMERCIAL

## 2022-07-13 VITALS
OXYGEN SATURATION: 100 % | DIASTOLIC BLOOD PRESSURE: 62 MMHG | TEMPERATURE: 98.3 F | RESPIRATION RATE: 18 BRPM | HEART RATE: 96 BPM | SYSTOLIC BLOOD PRESSURE: 110 MMHG

## 2022-07-13 DIAGNOSIS — K61.1 PERIRECTAL ABSCESS: Primary | ICD-10-CM

## 2022-07-13 LAB
ANION GAP SERPL CALCULATED.3IONS-SCNC: 8 MMOL/L (ref 4–13)
BASOPHILS # BLD AUTO: 0.09 THOUSANDS/ΜL (ref 0–0.1)
BASOPHILS NFR BLD AUTO: 1 % (ref 0–1)
BUN SERPL-MCNC: 10 MG/DL (ref 5–25)
CALCIUM SERPL-MCNC: 9.3 MG/DL (ref 8.4–10.2)
CHLORIDE SERPL-SCNC: 104 MMOL/L (ref 96–108)
CO2 SERPL-SCNC: 26 MMOL/L (ref 21–32)
CREAT SERPL-MCNC: 0.76 MG/DL (ref 0.6–1.3)
EOSINOPHIL # BLD AUTO: 0.19 THOUSAND/ΜL (ref 0–0.61)
EOSINOPHIL NFR BLD AUTO: 2 % (ref 0–6)
ERYTHROCYTE [DISTWIDTH] IN BLOOD BY AUTOMATED COUNT: 12.8 % (ref 11.6–15.1)
EXT PREG TEST URINE: NEGATIVE
EXT. CONTROL ED NAV: NORMAL
GFR SERPL CREATININE-BSD FRML MDRD: 109 ML/MIN/1.73SQ M
GLUCOSE SERPL-MCNC: 73 MG/DL (ref 65–140)
HCT VFR BLD AUTO: 44.5 % (ref 34.8–46.1)
HGB BLD-MCNC: 14.7 G/DL (ref 11.5–15.4)
HIV 1+2 AB+HIV1 P24 AG SERPL QL IA: NORMAL
IMM GRANULOCYTES # BLD AUTO: 0.09 THOUSAND/UL (ref 0–0.2)
IMM GRANULOCYTES NFR BLD AUTO: 1 % (ref 0–2)
LYMPHOCYTES # BLD AUTO: 2.85 THOUSANDS/ΜL (ref 0.6–4.47)
LYMPHOCYTES NFR BLD AUTO: 25 % (ref 14–44)
MCH RBC QN AUTO: 30.4 PG (ref 26.8–34.3)
MCHC RBC AUTO-ENTMCNC: 33 G/DL (ref 31.4–37.4)
MCV RBC AUTO: 92 FL (ref 82–98)
MONOCYTES # BLD AUTO: 0.71 THOUSAND/ΜL (ref 0.17–1.22)
MONOCYTES NFR BLD AUTO: 6 % (ref 4–12)
NEUTROPHILS # BLD AUTO: 7.27 THOUSANDS/ΜL (ref 1.85–7.62)
NEUTS SEG NFR BLD AUTO: 65 % (ref 43–75)
NRBC BLD AUTO-RTO: 0 /100 WBCS
PLATELET # BLD AUTO: 259 THOUSANDS/UL (ref 149–390)
PMV BLD AUTO: 9.1 FL (ref 8.9–12.7)
POTASSIUM SERPL-SCNC: 3.8 MMOL/L (ref 3.5–5.3)
RBC # BLD AUTO: 4.84 MILLION/UL (ref 3.81–5.12)
SODIUM SERPL-SCNC: 138 MMOL/L (ref 135–147)
WBC # BLD AUTO: 11.2 THOUSAND/UL (ref 4.31–10.16)

## 2022-07-13 PROCEDURE — 87205 SMEAR GRAM STAIN: CPT | Performed by: STUDENT IN AN ORGANIZED HEALTH CARE EDUCATION/TRAINING PROGRAM

## 2022-07-13 PROCEDURE — 81025 URINE PREGNANCY TEST: CPT | Performed by: EMERGENCY MEDICINE

## 2022-07-13 PROCEDURE — 80048 BASIC METABOLIC PNL TOTAL CA: CPT | Performed by: EMERGENCY MEDICINE

## 2022-07-13 PROCEDURE — 87070 CULTURE OTHR SPECIMN AEROBIC: CPT | Performed by: STUDENT IN AN ORGANIZED HEALTH CARE EDUCATION/TRAINING PROGRAM

## 2022-07-13 PROCEDURE — G1004 CDSM NDSC: HCPCS

## 2022-07-13 PROCEDURE — 36415 COLL VENOUS BLD VENIPUNCTURE: CPT | Performed by: EMERGENCY MEDICINE

## 2022-07-13 PROCEDURE — 96361 HYDRATE IV INFUSION ADD-ON: CPT

## 2022-07-13 PROCEDURE — 99285 EMERGENCY DEPT VISIT HI MDM: CPT | Performed by: EMERGENCY MEDICINE

## 2022-07-13 PROCEDURE — 87186 SC STD MICRODIL/AGAR DIL: CPT | Performed by: STUDENT IN AN ORGANIZED HEALTH CARE EDUCATION/TRAINING PROGRAM

## 2022-07-13 PROCEDURE — 96374 THER/PROPH/DIAG INJ IV PUSH: CPT

## 2022-07-13 PROCEDURE — 74176 CT ABD & PELVIS W/O CONTRAST: CPT

## 2022-07-13 PROCEDURE — 87077 CULTURE AEROBIC IDENTIFY: CPT | Performed by: STUDENT IN AN ORGANIZED HEALTH CARE EDUCATION/TRAINING PROGRAM

## 2022-07-13 PROCEDURE — 99284 EMERGENCY DEPT VISIT MOD MDM: CPT

## 2022-07-13 PROCEDURE — 85025 COMPLETE CBC W/AUTO DIFF WBC: CPT | Performed by: EMERGENCY MEDICINE

## 2022-07-13 RX ORDER — IBUPROFEN 600 MG/1
600 TABLET ORAL EVERY 6 HOURS PRN
Qty: 20 TABLET | Refills: 0 | Status: SHIPPED | OUTPATIENT
Start: 2022-07-13 | End: 2022-09-23

## 2022-07-13 RX ORDER — LIDOCAINE HYDROCHLORIDE 20 MG/ML
20 INJECTION, SOLUTION EPIDURAL; INFILTRATION; INTRACAUDAL; PERINEURAL ONCE
Status: COMPLETED | OUTPATIENT
Start: 2022-07-13 | End: 2022-07-13

## 2022-07-13 RX ORDER — OXYCODONE HYDROCHLORIDE AND ACETAMINOPHEN 5; 325 MG/1; MG/1
1 TABLET ORAL ONCE
Status: COMPLETED | OUTPATIENT
Start: 2022-07-13 | End: 2022-07-13

## 2022-07-13 RX ORDER — OXYCODONE HYDROCHLORIDE AND ACETAMINOPHEN 5; 325 MG/1; MG/1
1 TABLET ORAL EVERY 4 HOURS PRN
Qty: 10 TABLET | Refills: 0 | Status: SHIPPED | OUTPATIENT
Start: 2022-07-13 | End: 2022-08-09

## 2022-07-13 RX ORDER — HYDROMORPHONE HCL/PF 1 MG/ML
0.5 SYRINGE (ML) INJECTION ONCE
Status: COMPLETED | OUTPATIENT
Start: 2022-07-13 | End: 2022-07-13

## 2022-07-13 RX ORDER — LIDOCAINE 40 MG/G
CREAM TOPICAL ONCE
Status: COMPLETED | OUTPATIENT
Start: 2022-07-13 | End: 2022-07-13

## 2022-07-13 RX ADMIN — LIDOCAINE 1 APPLICATION: 40 CREAM TOPICAL at 21:00

## 2022-07-13 RX ADMIN — HYDROMORPHONE HYDROCHLORIDE 0.5 MG: 1 INJECTION, SOLUTION INTRAMUSCULAR; INTRAVENOUS; SUBCUTANEOUS at 21:03

## 2022-07-13 RX ADMIN — LIDOCAINE HYDROCHLORIDE 20 ML: 20 INJECTION, SOLUTION EPIDURAL; INFILTRATION; INTRACAUDAL; PERINEURAL at 21:00

## 2022-07-13 RX ADMIN — OXYCODONE HYDROCHLORIDE AND ACETAMINOPHEN 1 TABLET: 5; 325 TABLET ORAL at 22:11

## 2022-07-13 RX ADMIN — SODIUM CHLORIDE 1000 ML: 0.9 INJECTION, SOLUTION INTRAVENOUS at 19:39

## 2022-07-13 NOTE — ED ATTENDING ATTESTATION
7/13/2022  I, Sophie Sims DO, saw and evaluated the patient  I have discussed the patient with the resident/non-physician practitioner and agree with the resident's/non-physician practitioner's findings, Plan of Care, and MDM as documented in the resident's/non-physician practitioner's note, except where noted  All available labs and Radiology studies were reviewed  I was present for key portions of any procedure(s) performed by the resident/non-physician practitioner and I was immediately available to provide assistance  At this point I agree with the current assessment done in the Emergency Department  I have conducted an independent evaluation of this patient a history and physical is as follows:    72-year-old female complete the ED for evaluation of rectal pain and pressure for the past 3 days  No fevers or chills or sweats  Pain is worse she sits on it feels pressure when he has a bowel movement  She did notice some rectal discharge/drainage  No prior history of abscesses  She does not have anal intercourse  On physical exam: pt is well appearing, in NAD  mucous membranes moist   CTA b/l , heart RRR  Abdomen NT, ND, no R/R/G  Rectal exam:  There is fluctuance, induration, warmth and erythema 9 o'clock position of the anus  She has swelling and tenderness on internal rectal exam at 9:00 position as well  No active drainage  Normal bowel sounds  Neuro intact, gcs 15  Cap refill < 2 sec, skin warm and dry  Chaperoned by female ED tech for physical exam     ED Course  ED Course as of 07/15/22 1503   Wed Jul 13, 2022 2011 D/w surgery resident, Dr Bae, will see in consultation  Dr Bae performed I&D of pt's vinay-rectal abscess at bedside  Stable for d/c home, no antibiotics requested per surgery, f/u outpt w/ colorectal Dr Tona Patterson      Critical Care Time  Procedures

## 2022-07-14 NOTE — PROCEDURES
Incision and drain    Date/Time: 7/13/2022 9:46 PM  Performed by: Venancio Linda MD  Authorized by: Venancio Linda MD   Universal Protocol:  Consent: Written consent obtained  Consent given by: patient  Timeout called at: 7/13/2022 9:46 PM   Patient understanding: patient does not state understanding of the procedure being performed  Patient identity confirmed: verbally with patient and arm band      Patient location:  ED  Location:     Type:  Abscess    Size:  2x2 cm    Location:  Anogenital    Anogenital location:  Perirectal  Pre-procedure details:     Skin preparation:  Betadine  Anesthesia (see MAR for exact dosages): Anesthesia method:  Topical application and local infiltration    Topical anesthetic:  Lidocaine gel    Local anesthetic:  Lidocaine 2% WITH epi  Procedure details:     Complexity:  Simple    Needle aspiration: no      Incision types:  Cruciate    Scalpel blade:  11    Approach:  Open    Incision depth:  Dermal    Wound management:  Probed and deloculated and irrigated with saline    Irrigation with saline:  60ml    Drainage:  Bloody    Drainage amount: Moderate    Wound treatment:  Wound left open    Packing materials:  None  Post-procedure details:     Patient tolerance of procedure:   Tolerated well, no immediate complications          Venancio Linda MD  7/13/2022  9:56 PM

## 2022-07-14 NOTE — ED PROVIDER NOTES
History  Chief Complaint   Patient presents with    Abscess     Pt reports swollen and painful bump near rectum x 3 days and has drainage from rectum since yesterday       24 yo F presenting with a painful "bump" in her anal/vaginal region which she noticed 3 days ago and has been increasing in size since  She noted some rectal discharge this morning  She denies fever/chills, nausea/vominting, SOB, diarrhea/constipation, hematochezia/hematuria  History provided by:  Patient   used: No    Abscess  Location:  Pelvis  Pelvic abscess location:  Perianal and L buttock  Size:  4cm x 4cm  Abscess quality: induration and painful    Abscess quality: not draining and no redness    Red streaking: no    Duration:  3 days  Progression:  Worsening  Pain details:     Quality:  Throbbing and sharp    Severity:  Severe    Duration:  3 days    Timing:  Constant    Progression:  Worsening  Chronicity:  New  Context: not injected drug use, not insect bite/sting and not skin injury    Relieved by:  Nothing  Exacerbated by: Sitting directly on it or using the muscles around the area by walking/moving    Ineffective treatments:  None tried  Associated symptoms: no fatigue, no fever, no nausea and no vomiting        None       Past Medical History:   Diagnosis Date    Anxiety 2015    Depression 2015    Headache     Heart murmur 2021    Migraine        Past Surgical History:   Procedure Laterality Date    TONSILLECTOMY         Family History   Problem Relation Age of Onset    Other Mother         KIDNEY MASS BIOPSY NO CANCER     Anxiety disorder Father     Bipolar disorder Father     Suicide Attempts Father     Alcohol abuse Father     Anxiety disorder Sister     Bipolar disorder Sister     Depression Sister     Anxiety disorder Brother         Celexa    Depression Brother     No Known Problems Maternal Grandmother     Leukemia Maternal Grandfather     No Known Problems Paternal Grandmother    [employer] Anuerysm Paternal Grandfather         BRAIN     Dementia Paternal Anam Ochoa     Prostate cancer Other     Colon cancer Other     Hypertension Other     Hyperlipidemia Other      I have reviewed and agree with the history as documented  E-Cigarette/Vaping    E-Cigarette Use Current Some Day User     Start Date 19      E-Cigarette/Vaping Substances    Nicotine Yes      Social History     Tobacco Use    Smoking status: Former Smoker     Packs/day: 0 00     Years: 5 00     Pack years: 0 00     Start date: 3/15/2014     Quit date: 2018     Years since quittin 0    Smokeless tobacco: Never Used   Vaping Use    Vaping Use: Some days    Start date: 2018    Substances: Nicotine   Substance Use Topics    Alcohol use: Yes     Alcohol/week: 1 0 standard drink     Types: 1 Cans of beer per week     Comment: Socially    Drug use: No     Types: Marijuana     Comment: history of        Review of Systems   Constitutional: Negative for chills, fatigue and fever  Eyes: Negative for pain and visual disturbance  Respiratory: Negative for cough and shortness of breath  Cardiovascular: Negative for chest pain and palpitations  Gastrointestinal: Negative for abdominal pain, nausea and vomiting  Genitourinary: Positive for pelvic pain  Negative for difficulty urinating, dysuria and hematuria  Musculoskeletal: Negative for arthralgias and back pain  Skin: Negative for color change and rash  Neurological: Negative for syncope, weakness and numbness  All other systems reviewed and are negative        Physical Exam  ED Triage Vitals   Temperature Pulse Respirations Blood Pressure SpO2   22 1752 22 1751 22 1751 22 17522 175   98 3 °F (36 8 °C) 101 18 128/76 100 %      Temp Source Heart Rate Source Patient Position - Orthostatic VS BP Location FiO2 (%)   22 1752 22 1751 22 1751 22 175 --   Oral Monitor Sitting Right arm       Pain Score       07/13/22 1751       4             Orthostatic Vital Signs  Vitals:    07/13/22 1751 07/13/22 2008   BP: 128/76 107/60   Pulse: 101 98   Patient Position - Orthostatic VS: Sitting        Physical Exam  Vitals and nursing note reviewed  Exam conducted with a chaperone present  Constitutional:       General: She is not in acute distress  Appearance: She is well-developed  HENT:      Head: Normocephalic and atraumatic  Eyes:      Conjunctiva/sclera: Conjunctivae normal    Cardiovascular:      Rate and Rhythm: Normal rate and regular rhythm  Heart sounds: No murmur heard  Pulmonary:      Effort: Pulmonary effort is normal  No respiratory distress  Breath sounds: Normal breath sounds  Abdominal:      Palpations: Abdomen is soft  Tenderness: There is no abdominal tenderness  Genitourinary:     General: Normal vulva  Exam position: Knee-chest position  Rectum: Mass, tenderness and external hemorrhoid present  Musculoskeletal:      Cervical back: Neck supple  Skin:     General: Skin is warm and dry  Neurological:      General: No focal deficit present  Mental Status: She is alert  Psychiatric:         Mood and Affect: Mood normal          ED Medications  Medications   oxyCODONE-acetaminophen (PERCOCET) 5-325 mg per tablet 1 tablet (has no administration in time range)   sodium chloride 0 9 % bolus 1,000 mL (0 mL Intravenous Stopped 7/13/22 2100)   lidocaine (LMX) 4 % cream (1 application Topical Given 7/13/22 2100)   lidocaine (PF) (XYLOCAINE-MPF) 2 % injection 20 mL (20 mL Infiltration Given by Other 7/13/22 2100)   HYDROmorphone (DILAUDID) injection 0 5 mg (0 5 mg Intravenous Given 7/13/22 2103)       Diagnostic Studies  Results Reviewed     Procedure Component Value Units Date/Time    Wound culture and Gram stain [[id number]] Collected: 07/13/22 2142    Lab Status:  In process Specimen: Wound from Perineum Updated: 07/13/22 2147    Wound culture and Gram stain [[id number]] Collected: 07/13/22 2142    Lab Status:  In process Specimen: Wound from Perineum Updated: 07/13/22 8873    Basic metabolic panel [[id number]] Collected: 07/13/22 1940    Lab Status: Final result Specimen: Blood from Arm, Right Updated: 07/13/22 2005     Sodium 138 mmol/L      Potassium 3 8 mmol/L      Chloride 104 mmol/L      CO2 26 mmol/L      ANION GAP 8 mmol/L      BUN 10 mg/dL      Creatinine 0 76 mg/dL      Glucose 73 mg/dL      Calcium 9 3 mg/dL      eGFR 109 ml/min/1 73sq m     Narrative:      [place] guidelines for Chronic Kidney Disease (CKD):     Stage 1 with normal or high GFR (GFR > 90 mL/min/1 73 square meters)    Stage 2 Mild CKD (GFR = 60-89 mL/min/1 73 square meters)    Stage 3A Moderate CKD (GFR = 45-59 mL/min/1 73 square meters)    Stage 3B Moderate CKD (GFR = 30-44 mL/min/1 73 square meters)    Stage 4 Severe CKD (GFR = 15-29 mL/min/1 73 square meters)    Stage 5 End Stage CKD (GFR <15 mL/min/1 73 square meters)  Note: GFR calculation is accurate only with a steady state creatinine    CBC and differential [[id number]]  (Abnormal) Collected: 07/13/22 1940    Lab Status: Final result Specimen: Blood from Arm, Right Updated: 07/13/22 1946     WBC 11 20 Thousand/uL      RBC 4 84 Million/uL      Hemoglobin 14 7 g/dL      Hematocrit 44 5 %      MCV 92 fL      MCH 30 4 pg      MCHC 33 0 g/dL      RDW 12 8 %      MPV 9 1 fL      Platelets 528 Thousands/uL      nRBC 0 /100 WBCs      Neutrophils Relative 65 %      Immat GRANS % 1 %      Lymphocytes Relative 25 %      Monocytes Relative 6 %      Eosinophils Relative 2 %      Basophils Relative 1 %      Neutrophils Absolute 7 27 Thousands/µL      Immature Grans Absolute 0 09 Thousand/uL      Lymphocytes Absolute 2 85 Thousands/µL      Monocytes Absolute 0 71 Thousand/µL      Eosinophils Absolute 0 19 Thousand/µL      Basophils Absolute 0 09 Thousands/µL     POCT pregnancy, urine [[id number]]  (Normal) Resulted: 07/13/22 1933    Lab Status: Final result Updated: 07/13/22 1933     EXT PREG TEST UR (Ref: Negative) NEGATIVE     Control VALID                 CT abdomen pelvis wo contrast   Final Result by Yolanda Chan MD (07/13 1958)      2 2 x 1 7 cm structure located posterolateral to the anal verge possibly representing an abscess  Limited assessment without IV contrast       The study was marked in EPIC for immediate notification  Workstation performed: [id number]               Procedures  Procedures      ED Course  ED Course as of 07/13/22 2212 Wed Jul 13, 2022 2146 WBC(!): 11 20 2146 CT abdomen pelvis wo contrast                              SBIRT 22yo+    Flowsheet Row Most Recent Value   SBIRT (23 yo +)    In order to provide better care to our patients, we are screening all of our patients for alcohol and drug use  Would it be okay to ask you these screening questions?  Unable to answer at this time Filed at: 07/13/2022 1952                MDM  Number of Diagnoses or Management Options  Perirectal abscess  Diagnosis management comments: DDX including but not limited to:  Superficial abscess, perirectal abscess, cellulitis, lymphangitis, folliculitis, carbuncle,, necrotizing fasciitis       Amount and/or Complexity of Data Reviewed  Clinical lab tests: ordered and reviewed  Tests in the radiology section of CPT®: ordered and reviewed  Tests in the medicine section of CPT®: reviewed and ordered  Discussion of test results with the performing providers: yes  Decide to obtain previous medical records or to obtain history from someone other than the patient: yes  Review and summarize past medical records: yes  Independent visualization of images, tracings, or specimens: yes    Risk of Complications, Morbidity, and/or Mortality  Presenting problems: high  Diagnostic procedures: moderate  Management options: moderate    Patient Progress  Patient progress: stable      Disposition  Final diagnoses:   Perirectal abscess     Time reflects when diagnosis was documented in both MDM as applicable and the Disposition within this note     Time User Action Codes Description Comment    7/13/2022  9:25 PM Angie Solano Add [K61 1] Perirectal abscess     7/13/2022  9:54 PM Angie Boledwardese Add [L02 91] Abscess     7/13/2022  9:54 PM Angieneil Solano Remove [L02 91] Abscess       ED Disposition     ED Disposition   Discharge    Condition   Stable    Date/Time   Wed Jul 13, 2022  9:54 PM    Comment   bAran Johnson discharge to home/self care  Follow-up Information     Follow up With Specialties Details Why Contact Info    Vasyl Pedro MD Colon and Rectal Surgery Schedule an appointment as soon as possible for a visit in 2 week(s)  [address] [place] [address] [address] Call  As needed [facility] [place] [address]  [address]  [place] [address]  [phone]            Patient's Medications   Discharge Prescriptions    IBUPROFEN (MOTRIN) 600 MG TABLET    Take 1 tablet (600 mg total) by mouth every 6 (six) hours as needed for mild pain       Start Date: 7/13/2022 End Date: --       Order Dose: 600 mg       Quantity: 20 tablet    Refills: 0    OXYCODONE-ACETAMINOPHEN (PERCOCET) 5-325 MG PER TABLET    Take 1 tablet by mouth every 4 (four) hours as needed for moderate pain for up to 10 doses Max Daily Amount: 6 tablets       Start Date: 7/13/2022 End Date: --       Order Dose: 1 tablet       Quantity: 10 tablet    Refills: 0     No discharge procedures on file  PDMP Review       Value Time User    PDMP Reviewed  Yes 7/13/2022  9:57 PM Suzie Armenta DO           ED Provider  Attending physically available and evaluated Abran Johnson I managed the patient along with the ED Attending      Electronically Signed by         Felix Watkins DO  07/13/22 6087

## 2022-07-18 LAB
BACTERIA WND AEROBE CULT: ABNORMAL
BACTERIA WND AEROBE CULT: ABNORMAL
BACTERIA WND AEROBE CULT: NORMAL
GRAM STN SPEC: ABNORMAL
GRAM STN SPEC: ABNORMAL
GRAM STN SPEC: NORMAL

## 2022-07-19 ENCOUNTER — OFFICE VISIT (OUTPATIENT)
Dept: FAMILY MEDICINE CLINIC | Facility: CLINIC | Age: 27
End: 2022-07-19
Payer: COMMERCIAL

## 2022-07-19 VITALS
HEART RATE: 77 BPM | BODY MASS INDEX: 23.22 KG/M2 | SYSTOLIC BLOOD PRESSURE: 94 MMHG | OXYGEN SATURATION: 99 % | DIASTOLIC BLOOD PRESSURE: 60 MMHG | RESPIRATION RATE: 16 BRPM | TEMPERATURE: 98.2 F | WEIGHT: 123 LBS | HEIGHT: 61 IN

## 2022-07-19 DIAGNOSIS — F33.0 MDD (MAJOR DEPRESSIVE DISORDER), RECURRENT EPISODE, MILD (HCC): Primary | ICD-10-CM

## 2022-07-19 DIAGNOSIS — R41.840 INATTENTION: ICD-10-CM

## 2022-07-19 DIAGNOSIS — Z23 ENCOUNTER FOR ADMINISTRATION OF VACCINE: ICD-10-CM

## 2022-07-19 DIAGNOSIS — F41.9 ANXIETY: ICD-10-CM

## 2022-07-19 PROCEDURE — 99214 OFFICE O/P EST MOD 30 MIN: CPT | Performed by: FAMILY MEDICINE

## 2022-07-19 PROCEDURE — 3725F SCREEN DEPRESSION PERFORMED: CPT | Performed by: FAMILY MEDICINE

## 2022-07-19 PROCEDURE — 90471 IMMUNIZATION ADMIN: CPT

## 2022-07-19 PROCEDURE — 90715 TDAP VACCINE 7 YRS/> IM: CPT

## 2022-07-19 RX ORDER — BUPROPION HYDROCHLORIDE 150 MG/1
150 TABLET ORAL EVERY MORNING
Qty: 30 TABLET | Refills: 5 | Status: SHIPPED | OUTPATIENT
Start: 2022-07-19 | End: 2022-09-23

## 2022-07-19 NOTE — PROGRESS NOTES
Nguyen Camacho was seen today for adhd, labs only, medication refill and hm  Diagnoses and all orders for this visit:    MDD (major depressive disorder), recurrent episode, mild ([address] )  -     Ambulatory Referral to Psychiatry; Future  -     buPROPion (Wellbutrin XL) 150 mg 24 hr tablet; Take 1 tablet (150 mg total) by mouth every morning    Anxiety  -     Ambulatory Referral to Psychiatry; Future  -     buPROPion (Wellbutrin XL) 150 mg 24 hr tablet; Take 1 tablet (150 mg total) by mouth every morning    Inattention  -     Ambulatory Referral to Psychiatry; Future  -     buPROPion (Wellbutrin XL) 150 mg 24 hr tablet; Take 1 tablet (150 mg total) by mouth every morning    Encounter for administration of vaccine  -     TDAP VACCINE GREATER THAN OR EQUAL TO 8YO IM   differential reviewed in addition to treatment options  Recommend start with treatment of anxiety/depression  After this can consider treatment of ADD if symptoms are not improved with above  Reassured her she is not showing symptoms of bipolar and medications we use are very different  Reviewed risks and benefits and side effects of meds started  Refer to psych but agrees to start meds here as she will have wait to get in   Prescription wellbutrin   Tdap given today   Follow up in 4 weeks       Return in about 1 month (around 8/19/2022) for mood check       Subjective:   Nguyen Camacho is a 23 y o  female here today for a follow-up on her current medical conditions:    Patient Active Problem List   Diagnosis    Cardiac murmur    Near syncope    Anxiety    Depression    MDD (major depressive disorder), recurrent episode, mild (HCC)    Migraine with aura and without status migrainosus, not intractable    Inattention       Patient Care Team:  Suzan Gibbs DO as PCP - General (Family Medicine)  Zunilda Valenzuela MD as Consulting Physician (Colon and Rectal Surgery)    Current Medications:  Current Outpatient Medications   Medication Sig Dispense Refill    buPROPion (Wellbutrin XL) 150 mg 24 hr tablet Take 1 tablet (150 mg total) by mouth every morning 30 tablet 5    ibuprofen (MOTRIN) 600 mg tablet Take 1 tablet (600 mg total) by mouth every 6 (six) hours as needed for mild pain 20 tablet 0    oxyCODONE-acetaminophen (Percocet) 5-325 mg per tablet Take 1 tablet by mouth every 4 (four) hours as needed for moderate pain for up to 10 doses Max Daily Amount: 6 tablets 10 tablet 0     No current facility-administered medications for this visit  HPI:  Chief Complaint   Patient presents with    ADHD     Would like referral to psych   And discuss ADHD    Labs Only     22 hospital labs    Medication Refill     N/A         PHQ9/GAD7 ASRS  Tdap ok     -- Above per clinical staff and reviewed  --    PHQ-2/9 Depression Screening    Little interest or pleasure in doing things: 1 - several days  Feeling down, depressed, or hopeless: 1 - several days  Trouble falling or staying asleep, or sleeping too much: 0 - not at all  Feeling tired or having little energy: 0 - not at all  Poor appetite or overeatin - several days  Feeling bad about yourself - or that you are a failure or have let yourself or your family down: 1 - several days  Trouble concentrating on things, such as reading the newspaper or watching television: 3 - nearly every day  Moving or speaking so slowly that other people could have noticed  Or the opposite - being so fidgety or restless that you have been moving around a lot more than usual: 0 - not at all  Thoughts that you would be better off dead, or of hurting yourself in some way: 0 - not at all  PHQ-9 Score: 7   PHQ-9 Interpretation: Mild depression        NATALIE-7 Flowsheet Screening    Flowsheet Row Most Recent Value   Over the last 2 weeks, how often have you been bothered by any of the following problems?     Feeling nervous, anxious, or on edge 1   Not being able to stop or control worrying 2   Worrying too much about different things 2 Trouble relaxing 2   Being so restless that it is hard to sit still 0   Becoming easily annoyed or irritable 1   Feeling afraid as if something awful might happen 0   NATALIE-7 Total Score 8           july 2022 labs noram   last labs in [facility] 2015   [facility] 2021 TSH, CMP, CBC normal   saw cardio  normal echo  Today:  Wanted to talk about psych eval or ADHD   Whole life messy, cannot complete goals, following through   Difficulty with simple daily tasks   Constantly feels her head is chaotic   Overall quality of her daily life   She does not remember being hyerpactive, nor does her mom   For her it is in her head   Feels like she goes all the time but cannot complete things  Dad manic and she does not see that in her at all   No impulsive behaviors   Did well in elementary school  Good grades  Does not recall a lot of her childhood  Middle school cyber school due to too much anxiety to go to school  SA when she was 10 and parents were getting   Very difficult to focus at home  Was not able to graduate from high school  Was in counseling later in high school after self harm  Childhood was very traumatic, dad was bipolar, alcoholic, violent, a lot of fighting   Very behind always  From there on  Used to take celexa for 2 years and did not work well  Felt zombie like and lack of empathy  Has never tried anything else  Life is not where she wants it to be   living with mom and step dad       The following portions of the patient's history were reviewed and updated as appropriate: allergies, current medications, past family history, past medical history, past social history, past surgical history and problem list     Objective:  Vitals:  BP 94/60   Pulse 77   Temp 98 2 °F (36 8 °C)   Resp 16   Ht 5' 1" (1 549 m)   Wt 55 8 kg (123 lb)   SpO2 99%   BMI 23 24 kg/m²    Wt Readings from Last 3 Encounters:   07/19/22 55 8 kg (123 lb)   05/24/22 49 9 kg (110 lb)   05/18/22 49 7 kg (109 lb 9 6 oz) BP Readings from Last 3 Encounters:   07/19/22 94/60   07/13/22 110/62   05/24/22 110/78        Review of Systems   She has no other concerns  No unexpected weight changes  No chest pain, SOB, or palpitations  No GERD  No changes in bowels or bladder  Sleeping well  +  mood changes  Physical Exam   Constitutional:  she appears well-developed and well-nourished  HENT: Head: Normocephalic  Neck: Neck supple  Cardiovascular: Normal rate, regular rhythm and normal heart sounds  Pulmonary/Chest: Effort normal and breath sounds normal  No wheezes, rales, or rhonchi  Abdominal: Soft  Bowel sounds are normal  There is no tenderness  No hepatosplenomegaly  Musculoskeletal: she exhibits no edema  Lymphadenopathy: she has no cervical adenopathy  Neurological: she is alert and oriented to person, place, and time  Skin: Skin is warm and dry  Psychiatric: she has a normal mood and affect  her behavior is normal  Thought content normal        Depression Screening and Follow-up Plan: Patient assessed for underlying major depression  Brief counseling provided and recommend additional follow-up/re-evaluation next office visit

## 2022-07-20 PROBLEM — R41.840 INATTENTION: Status: ACTIVE | Noted: 2022-07-20

## 2022-07-21 PROBLEM — K61.0 PERIANAL ABSCESS: Status: ACTIVE | Noted: 2022-07-21

## 2022-07-25 ENCOUNTER — TELEPHONE (OUTPATIENT)
Dept: PSYCHIATRY | Facility: CLINIC | Age: 27
End: 2022-07-25

## 2022-07-26 ENCOUNTER — ANESTHESIA EVENT (OUTPATIENT)
Dept: PERIOP | Facility: AMBULARY SURGERY CENTER | Age: 27
End: 2022-07-26
Payer: COMMERCIAL

## 2022-07-29 NOTE — PRE-PROCEDURE INSTRUCTIONS
Pre-Surgery Instructions:   Medication Instructions    buPROPion (Wellbutrin XL) 150 mg 24 hr tablet Take day of surgery       Pt verbalizes understanding of the following:    - Bathing instructions, has chg, neck down, no genitals  - No lotions, powders, sprays, deodorant, jewelry, body piercings or make-up    - NPO after MN  - Avoid OTC non-directed Vit/ Suppl/ Herbals 7 days prior to surgery to ensure no drug interactions with perioperative surgical/ anesthetic meds  - Avoid NSAIDs 3 days prior  - Avoid ASA containing products 5 days prior    - Bring list of meds with last dose noted  - Triductor cards & photo id

## 2022-08-01 ENCOUNTER — ANESTHESIA (OUTPATIENT)
Dept: PERIOP | Facility: AMBULARY SURGERY CENTER | Age: 27
End: 2022-08-01
Payer: COMMERCIAL

## 2022-08-01 ENCOUNTER — HOSPITAL ENCOUNTER (OUTPATIENT)
Facility: AMBULARY SURGERY CENTER | Age: 27
Setting detail: OUTPATIENT SURGERY
Discharge: HOME | End: 2022-08-01
Attending: COLON & RECTAL SURGERY | Admitting: COLON & RECTAL SURGERY
Payer: COMMERCIAL

## 2022-08-01 VITALS
SYSTOLIC BLOOD PRESSURE: 102 MMHG | HEART RATE: 85 BPM | HEIGHT: 61 IN | TEMPERATURE: 97.5 F | RESPIRATION RATE: 18 BRPM | BODY MASS INDEX: 23.22 KG/M2 | DIASTOLIC BLOOD PRESSURE: 59 MMHG | OXYGEN SATURATION: 97 % | WEIGHT: 123 LBS

## 2022-08-01 DIAGNOSIS — K61.0 PERIANAL ABSCESS: Primary | ICD-10-CM

## 2022-08-01 LAB
EXT PREGNANCY TEST URINE: NEGATIVE
EXT. CONTROL: NORMAL

## 2022-08-01 PROCEDURE — 46270 REMOVE ANAL FIST SUBQ: CPT | Performed by: COLON & RECTAL SURGERY

## 2022-08-01 PROCEDURE — C9290 INJ, BUPIVACAINE LIPOSOME: HCPCS | Performed by: COLON & RECTAL SURGERY

## 2022-08-01 PROCEDURE — 81025 URINE PREGNANCY TEST: CPT | Performed by: COLON & RECTAL SURGERY

## 2022-08-01 RX ORDER — DEXAMETHASONE SODIUM PHOSPHATE 10 MG/ML
INJECTION, SOLUTION INTRAMUSCULAR; INTRAVENOUS AS NEEDED
Status: DISCONTINUED | OUTPATIENT
Start: 2022-08-01 | End: 2022-08-01

## 2022-08-01 RX ORDER — OXYCODONE HYDROCHLORIDE 5 MG/1
5 TABLET ORAL EVERY 4 HOURS PRN
Qty: 20 TABLET | Refills: 0 | Status: SHIPPED | OUTPATIENT
Start: 2022-08-01 | End: 2022-08-09

## 2022-08-01 RX ORDER — GLYCOPYRROLATE 0.2 MG/ML
INJECTION INTRAMUSCULAR; INTRAVENOUS AS NEEDED
Status: DISCONTINUED | OUTPATIENT
Start: 2022-08-01 | End: 2022-08-01

## 2022-08-01 RX ORDER — MIDAZOLAM HYDROCHLORIDE 2 MG/2ML
INJECTION, SOLUTION INTRAMUSCULAR; INTRAVENOUS AS NEEDED
Status: DISCONTINUED | OUTPATIENT
Start: 2022-08-01 | End: 2022-08-01

## 2022-08-01 RX ORDER — SODIUM CHLORIDE, SODIUM LACTATE, POTASSIUM CHLORIDE, CALCIUM CHLORIDE 600; 310; 30; 20 MG/100ML; MG/100ML; MG/100ML; MG/100ML
125 INJECTION, SOLUTION INTRAVENOUS CONTINUOUS
Status: DISCONTINUED | OUTPATIENT
Start: 2022-08-01 | End: 2022-08-01 | Stop reason: HOSPADM

## 2022-08-01 RX ORDER — MAGNESIUM HYDROXIDE 1200 MG/15ML
LIQUID ORAL AS NEEDED
Status: DISCONTINUED | OUTPATIENT
Start: 2022-08-01 | End: 2022-08-01 | Stop reason: HOSPADM

## 2022-08-01 RX ORDER — ROCURONIUM BROMIDE 10 MG/ML
INJECTION, SOLUTION INTRAVENOUS AS NEEDED
Status: DISCONTINUED | OUTPATIENT
Start: 2022-08-01 | End: 2022-08-01

## 2022-08-01 RX ORDER — FENTANYL CITRATE/PF 50 MCG/ML
25 SYRINGE (ML) INJECTION
Status: DISCONTINUED | OUTPATIENT
Start: 2022-08-01 | End: 2022-08-01 | Stop reason: HOSPADM

## 2022-08-01 RX ORDER — ONDANSETRON 2 MG/ML
4 INJECTION INTRAMUSCULAR; INTRAVENOUS ONCE AS NEEDED
Status: DISCONTINUED | OUTPATIENT
Start: 2022-08-01 | End: 2022-08-01 | Stop reason: HOSPADM

## 2022-08-01 RX ORDER — LIDOCAINE HYDROCHLORIDE 10 MG/ML
INJECTION, SOLUTION EPIDURAL; INFILTRATION; INTRACAUDAL; PERINEURAL AS NEEDED
Status: DISCONTINUED | OUTPATIENT
Start: 2022-08-01 | End: 2022-08-01

## 2022-08-01 RX ORDER — FENTANYL CITRATE 50 UG/ML
INJECTION, SOLUTION INTRAMUSCULAR; INTRAVENOUS AS NEEDED
Status: DISCONTINUED | OUTPATIENT
Start: 2022-08-01 | End: 2022-08-01

## 2022-08-01 RX ORDER — SODIUM CHLORIDE, SODIUM LACTATE, POTASSIUM CHLORIDE, CALCIUM CHLORIDE 600; 310; 30; 20 MG/100ML; MG/100ML; MG/100ML; MG/100ML
INJECTION, SOLUTION INTRAVENOUS CONTINUOUS PRN
Status: DISCONTINUED | OUTPATIENT
Start: 2022-08-01 | End: 2022-08-01

## 2022-08-01 RX ORDER — ONDANSETRON 2 MG/ML
INJECTION INTRAMUSCULAR; INTRAVENOUS AS NEEDED
Status: DISCONTINUED | OUTPATIENT
Start: 2022-08-01 | End: 2022-08-01

## 2022-08-01 RX ORDER — OXYCODONE HYDROCHLORIDE 5 MG/1
5 TABLET ORAL EVERY 4 HOURS PRN
Status: DISCONTINUED | OUTPATIENT
Start: 2022-08-01 | End: 2022-08-01 | Stop reason: HOSPADM

## 2022-08-01 RX ORDER — PROPOFOL 10 MG/ML
INJECTION, EMULSION INTRAVENOUS AS NEEDED
Status: DISCONTINUED | OUTPATIENT
Start: 2022-08-01 | End: 2022-08-01

## 2022-08-01 RX ADMIN — GLYCOPYRROLATE 0.2 MCG: 0.2 INJECTION INTRAMUSCULAR; INTRAVENOUS at 15:29

## 2022-08-01 RX ADMIN — DEXAMETHASONE SODIUM PHOSPHATE 5 MG: 10 INJECTION, SOLUTION INTRAMUSCULAR; INTRAVENOUS at 15:34

## 2022-08-01 RX ADMIN — SODIUM CHLORIDE, SODIUM LACTATE, POTASSIUM CHLORIDE, AND CALCIUM CHLORIDE: .6; .31; .03; .02 INJECTION, SOLUTION INTRAVENOUS at 14:52

## 2022-08-01 RX ADMIN — LIDOCAINE HYDROCHLORIDE 50 MG: 10 INJECTION, SOLUTION EPIDURAL; INFILTRATION; INTRACAUDAL; PERINEURAL at 15:22

## 2022-08-01 RX ADMIN — ROCURONIUM BROMIDE 25 MG: 50 INJECTION INTRAVENOUS at 15:22

## 2022-08-01 RX ADMIN — FENTANYL CITRATE 100 MCG: 50 INJECTION INTRAMUSCULAR; INTRAVENOUS at 15:20

## 2022-08-01 RX ADMIN — MIDAZOLAM 2 MG: 1 INJECTION INTRAMUSCULAR; INTRAVENOUS at 15:17

## 2022-08-01 RX ADMIN — ONDANSETRON 4 MG: 2 INJECTION INTRAMUSCULAR; INTRAVENOUS at 15:35

## 2022-08-01 RX ADMIN — PROPOFOL 150 MG: 10 INJECTION, EMULSION INTRAVENOUS at 15:22

## 2022-08-01 RX ADMIN — FENTANYL CITRATE 25 MCG: 50 INJECTION INTRAMUSCULAR; INTRAVENOUS at 16:11

## 2022-08-01 RX ADMIN — SUGAMMADEX 200 MG: 100 INJECTION, SOLUTION INTRAVENOUS at 15:47

## 2022-08-01 RX ADMIN — PROPOFOL 50 MG: 10 INJECTION, EMULSION INTRAVENOUS at 15:24

## 2022-08-01 NOTE — H&P
History and Physical   Colon and Rectal Surgery   Hortencia Walsh 23 y o  female MRN: [id number]  Unit/Bed#: OR [place] Encounter: [id number]  22   @NOW    No chief complaint on file  History of Present Illness   HPI:  Hortencia Walsh is a 23 y o  female who presents for exam under anesthesia for evaluation of potential fistula  Historical Information   Past Medical History:   Diagnosis Date    Anxiety     COVID 2021    Depression     Headache     Heart murmur     Migraine     Perianal abscess      Past Surgical History:   Procedure Laterality Date    COLONOSCOPY      TONSILLECTOMY         Meds/Allergies     Medications Prior to Admission   Medication    buPROPion (Wellbutrin XL) 150 mg 24 hr tablet    ibuprofen (MOTRIN) 600 mg tablet    oxyCODONE-acetaminophen (Percocet) 5-325 mg per tablet       No current facility-administered medications for this encounter      Allergies   Allergen Reactions    Augmentin [Amoxicillin-Pot Clavulanate] Nausea Only         Social History   Social History     Substance and Sexual Activity   Alcohol Use Yes    Alcohol/week: 1 0 standard drink    Types: 1 Cans of beer per week    Comment: Socially; 1-2x wk     Social History     Substance and Sexual Activity   Drug Use No    Types: Marijuana    Comment: history of     Social History     Tobacco Use   Smoking Status Former Smoker    Packs/day: 0 00    Years: 5 00    Pack years: 0 00    Start date: 3/15/2014   Gilbert Morris Quit date: 2018    Years since quittin 1   Smokeless Tobacco Never Used         Family History:   Family History   Problem Relation Age of Onset    Other Mother         KIDNEY MASS BIOPSY NO CANCER     Anxiety disorder Father     Bipolar disorder Father     Suicide Attempts Father     Alcohol abuse Father     Anxiety disorder Sister     Bipolar disorder Sister     Depression Sister     Anxiety disorder Brother         Celexa    Depression Brother     No Known Problems Maternal Grandmother     Leukemia Maternal Grandfather     No Known Problems Paternal Grandmother     Anuerysm Paternal Grandfather         BRAIN     Dementia Paternal Grandfather     Prostate cancer Other     Colon cancer Other     Hypertension Other     Hyperlipidemia Other          Objective     Current Vitals:   Height: 5' 1" (154 9 cm) (07/29/22 0830)  Weight - Scale: 55 8 kg (123 lb) (07/29/22 0830)  No intake or output data in the 24 hours ending 08/01/22 1358    Physical Exam:  General:  Resting comfortably in bed   Eyes:Sclera anicteric  ENT: Trachea midline  Pulm:  Symmetric chest raise  No respiratory Distress  CV:  Regular on monitor  Abdomen:  Soft NT ND  Extremities:  No clubbing/ cyanosis/ edema    Lab Results: I have personally reviewed pertinent lab results  Imaging: I have personally reviewed pertinent reports  ASSESSMENT:  Priscilla Blank is a 23 y o  female who presents for exam under anesthesia, possible surgical correction of anal fistula  Fistulotomy, seton, lift procedure, endorectal advancement flap, have all been discussed with the patient  Risks of anal surgery, including but not limited to pain, bleeding, failure to heal properly, fecal incontinence, persistent or recurrent anal disease, infection, fistula, abscess were reviewed  Questions were answered

## 2022-08-01 NOTE — OP NOTE
OPERATIVE REPORT  PATIENT NAME: Fatoumata Bolton    :  1995  MRN: [id number]  Pt Location: [facility] OR [facility] 05    SURGERY DATE: 2022    Surgeon(s) and Role:     * Tono Green MD - Primary     * Kassandra Martinez MD - Assisting    Preop Diagnosis:  Perianal abscess [K61 0]    Post-Op Diagnosis Codes:     * Perianal abscess [K61 0]    Procedure(s) (LRB):  EXAM UNDER ANESTHESIA (EUA) SUBCUTANEOUS FISTULOTOMY (N/A)    Specimen(s):  * No specimens in log *    Estimated Blood Loss:   Minimal    Drains:  * No LDAs found *    Anesthesia Type:   General    Operative Indications:  Perianal abscess [K61 0]      Operative Findings:  Subcutaneous fistula    Complications:   None    Procedure and Technique:  After preoperative identification in the preoperative holding area, the patient was taken the operating room placed in the supine position  Following introduction of general anesthesia the patient was in place in the prone jackknife position with buttocks taped apart  Patient was prepped and draped in usual sterile fashion  Timeout was undertaken and procedure begun  Examination was performed  2 cm from the left anterior lateral anal verge there is a small punctum  Anterior midline approximately 4 mm distal to the dentate line a small area of granulation tissue was noted  This probed to the area  This was consistent with subcutaneous fistula  Given its distal location, repair such as endorectal advancement flap could not be performed  There was no intersphincteric component to perform a ligation of intersphincteric fistula tract  Are 2 options therefore were subcutaneous fistulotomy or dermal advancement flap  Given its location dermal advancement flap would have been difficult as mobilization of the perineal body at this site would be challenging    As such given the fact that there was no obvious external anal sphincter involved within this, as well as palpation revealed at least 3-4 cm of more proximal external anal sphincter, fistulotomy was prepared for  Skin was excised over top of the probe  No muscle fibers were noted to be divided  A well granulated fistula tract was noted  Hemostasis was achieved using Bovie cautery  Marsupialization of the tract was performed using interrupted 3-0 Vicryl sutures  Anoscopy revealed no other lesions  Local field block was obtained using 40 cc of Exparel and bupivacaine  Patient was awaken from anesthesia and transferred to recovery room in stable condition       I was present for the entire procedure    Patient Disposition:  PACU       SIGNATURE: Ed Cook MD  DATE: August 1, 2022  TIME: 3:44 PM

## 2022-08-01 NOTE — ANESTHESIA PREPROCEDURE EVALUATION
Procedure:  EXAM UNDER ANESTHESIA (EUA)possible fistulotomy (N/A Anus)  FISTULOTOMY (N/A Anus)    Relevant Problems   ANESTHESIA (within normal limits)      CARDIO   (+) Cardiac murmur   (+) Migraine with aura and without status migrainosus, not intractable   (-) HTN (hypertension)   (-) Hyperlipidemia      ENDO   (-) Diabetes mellitus, type 2 (HCC)   (-) Hyperthyroidism   (-) Hypothyroidism      GI/HEPATIC   (-) Gastroesophageal reflux disease      /RENAL (within normal limits)      GYN (within normal limits)      HEMATOLOGY (within normal limits)      MUSCULOSKELETAL (within normal limits)      NEURO/PSYCH   (+) Anxiety   (+) MDD (major depressive disorder), recurrent episode, mild (HCC)   (+) Migraine with aura and without status migrainosus, not intractable      PULMONARY   (-) Asthma   (-) Sleep apnea        TTE 1/7/2022  Interpretation Summary         Left Ventricle: Left ventricular cavity size is normal  The left ventricular ejection fraction is 55%  Systolic function is normal  Wall motion is normal  Diastolic function is normal  Wall thickness is normal     Right Ventricle: Right ventricular cavity size is normal  Systolic function is normal     Mitral Valve: There is trace regurgitation  Physical Exam    Airway    Mallampati score: III  TM Distance: >3 FB  Neck ROM: full     Dental       Cardiovascular      Pulmonary      Other Findings        Anesthesia Plan  ASA Score- 2     Anesthesia Type- general with ASA Monitors  Additional Monitors:   Airway Plan: ETT  Plan Factors-Exercise tolerance (METS): >4 METS  Chart reviewed  EKG reviewed  Existing labs reviewed  Patient summary reviewed  Patient is not a current smoker  Induction- intravenous  Postoperative Plan- Plan for postoperative opioid use  Informed Consent- Anesthetic plan and risks discussed with patient, mother and father  I personally reviewed this patient with the CRNA   Discussed and agreed on the Anesthesia Plan with the CRNA  Scout Hernandez

## 2022-08-01 NOTE — DISCHARGE INSTRUCTIONS
May shower or tub bathe beginning today  Ensure you are not getting constipated using Metamucil 1 tablespoon 1-2 times daily with plenty of hydration  Pain medication as prescribed for pain may be combined with Tylenol  May use ibuprofen as well     Call for worsening pain, heavy bleeding, inability to urinate or fever greater than 101 5  4 weeks follow-up Dr Teo Cruz [phone]

## 2022-08-01 NOTE — ANESTHESIA POSTPROCEDURE EVALUATION
Post-Op Assessment Note    CV Status:  Stable  Pain Score: 0    Pain management: adequate     Mental Status:  Alert   Hydration Status:  Stable   PONV Controlled:  Controlled   Airway Patency:  Patent      Post Op Vitals Reviewed: Yes      Staff: CRNA         No complications documented      BP   124/76   Temp  97 2   Pulse  102   Resp   14   SpO2   97

## 2022-08-09 ENCOUNTER — OFFICE VISIT (OUTPATIENT)
Dept: FAMILY MEDICINE CLINIC | Facility: CLINIC | Age: 27
End: 2022-08-09
Payer: COMMERCIAL

## 2022-08-09 VITALS
DIASTOLIC BLOOD PRESSURE: 70 MMHG | BODY MASS INDEX: 22.84 KG/M2 | SYSTOLIC BLOOD PRESSURE: 98 MMHG | TEMPERATURE: 97.7 F | HEART RATE: 69 BPM | HEIGHT: 61 IN | WEIGHT: 121 LBS | OXYGEN SATURATION: 99 %

## 2022-08-09 DIAGNOSIS — R30.0 DYSURIA: Primary | ICD-10-CM

## 2022-08-09 DIAGNOSIS — N30.01 ACUTE CYSTITIS WITH HEMATURIA: ICD-10-CM

## 2022-08-09 LAB
BACTERIA UR QL AUTO: ABNORMAL /HPF
BILIRUB UR QL STRIP: NEGATIVE
CLARITY UR: CLEAR
COLOR UR: ABNORMAL
GLUCOSE UR STRIP-MCNC: NEGATIVE MG/DL
HGB UR QL STRIP.AUTO: ABNORMAL
KETONES UR STRIP-MCNC: NEGATIVE MG/DL
LEUKOCYTE ESTERASE UR QL STRIP: ABNORMAL
NITRITE UR QL STRIP: NEGATIVE
NON-SQ EPI CELLS URNS QL MICRO: ABNORMAL /HPF
PH UR STRIP.AUTO: 7.5 [PH]
PROT UR STRIP-MCNC: NEGATIVE MG/DL
RBC #/AREA URNS AUTO: ABNORMAL /HPF
SL AMB  POCT GLUCOSE, UA: ABNORMAL
SL AMB LEUKOCYTE ESTERASE,UA: ABNORMAL
SL AMB POCT BILIRUBIN,UA: ABNORMAL
SL AMB POCT BLOOD,UA: ABNORMAL
SL AMB POCT CLARITY,UA: ABNORMAL
SL AMB POCT COLOR,UA: YELLOW
SL AMB POCT KETONES,UA: ABNORMAL
SL AMB POCT NITRITE,UA: ABNORMAL
SL AMB POCT PH,UA: 8
SL AMB POCT SPECIFIC GRAVITY,UA: 1.01
SL AMB POCT URINE PROTEIN: ABNORMAL
SL AMB POCT UROBILINOGEN: ABNORMAL
SP GR UR STRIP.AUTO: 1.01 (ref 1–1.03)
UROBILINOGEN UR STRIP-ACNC: <2 MG/DL
WBC #/AREA URNS AUTO: ABNORMAL /HPF

## 2022-08-09 PROCEDURE — 81001 URINALYSIS AUTO W/SCOPE: CPT | Performed by: FAMILY MEDICINE

## 2022-08-09 PROCEDURE — 87077 CULTURE AEROBIC IDENTIFY: CPT | Performed by: FAMILY MEDICINE

## 2022-08-09 PROCEDURE — 99213 OFFICE O/P EST LOW 20 MIN: CPT | Performed by: FAMILY MEDICINE

## 2022-08-09 PROCEDURE — 81003 URINALYSIS AUTO W/O SCOPE: CPT | Performed by: FAMILY MEDICINE

## 2022-08-09 PROCEDURE — 87086 URINE CULTURE/COLONY COUNT: CPT | Performed by: FAMILY MEDICINE

## 2022-08-09 RX ORDER — CEPHALEXIN 500 MG/1
500 CAPSULE ORAL 2 TIMES DAILY
Qty: 14 CAPSULE | Refills: 0 | Status: SHIPPED | OUTPATIENT
Start: 2022-08-09 | End: 2022-08-16

## 2022-08-09 NOTE — PROGRESS NOTES
Assessment/Plan:   Alexander Hansen was seen today for back pain  Diagnoses and all orders for this visit:    Dysuria  -     POCT urine dip auto non-scope    Acute cystitis with hematuria  -     cephalexin (KEFLEX) 500 mg capsule; Take 1 capsule (500 mg total) by mouth 2 (two) times a day for 7 days  -     Urinalysis with microscopic  -     Urine culture; Future  -     Urine culture  treat for infection  If no infection may be due to some bladder irritants  Stay well hydrated  Call if persists or worsens  Patient Instructions   Bladder Irritants:  ·         Foods/Drinks that are bladder irritants:  ·         Drinks with caffeeine (soda, coffee, tea)  ·         Medicines with caffeine (exedrine, some over-the-counter cough meds)  ·         Carbonated drinks (soda, tonic water, fizzy water)  ·         Chocolate  ·         Alcohol  ·         Spicy foods  ·         Tomatoes and other high acid foods  ·         Sugar, honey, corn syrup  ·         Sugar substitutes made with aspartame (NutraSweet, Equal)  ·         Citrus fruits and drinks (stefani, oranges, grapefruit, pineapples, limes)  ·         Milk      To help your bladder:  Drink 6 - 8 oz of liquid each day  Water is best  Reducing the amount you drink can cause the lining of your bladder to become irritated  If you are drinking enough water your urine should be a pale yellow  No follow-ups on file  Subjective:    HPI  Alexander Hansen is a 23 y o  female who presents with:  Chief Complaint     Back Pain        HPI     Back Pain      Additional comments: Lower right          Last edited by Marin Steen on 8/9/2022  9:52 AM  (History)        ---Above per clinical staff & reviewed   ---        Today:  Started last Monday but then had abscess and fistulotomy surgery so thought it might go away  Burning on urination   Then was numb for few days after surgery  Saturday night felt like she still had to go   Some lower back pain on right   No fevers or chills   Not burnign too much   Some urgency, frequency   More cloudy   Guaze there and d/c from wound   No recent antibiotics   Period due soon       The following portions of the patient's history were reviewed and updated as appropriate: allergies, current medications, past family history, past medical history, past social history, past surgical history and problem list   Review of Systems  ROS:  all others negative - no chest pain, SOB, alejandro change in l urine, normal bowels  no GERD  sleeping well  mood good  Objective:    BP 98/70 (BP Location: Left arm, Patient Position: Sitting, Cuff Size: Standard)   Pulse 69   Temp 97 7 °F (36 5 °C) (Temporal)   Ht 5' 1" (1 549 m)   Wt 54 9 kg (121 lb)   LMP 07/20/2022   SpO2 99%   BMI 22 86 kg/m²   Wt Readings from Last 3 Encounters:   08/09/22 54 9 kg (121 lb)   07/29/22 55 8 kg (123 lb)   07/21/22 55 8 kg (123 lb)     BP Readings from Last 3 Encounters:   08/09/22 98/70   08/01/22 102/59   07/19/22 94/60       Current Medications:  Current Outpatient Medications   Medication Sig Dispense Refill    buPROPion (Wellbutrin XL) 150 mg 24 hr tablet Take 1 tablet (150 mg total) by mouth every morning 30 tablet 5    ibuprofen (MOTRIN) 600 mg tablet Take 1 tablet (600 mg total) by mouth every 6 (six) hours as needed for mild pain 20 tablet 0     No current facility-administered medications for this visit  Physical Exam   Constitutional: she appears well-developed and well-nourished  HENT: Head: Normocephalic  Neck: No pain on exam  Neck supple  Cardiovascular: Normal rate, regular rhythm and normal heart sounds  Pulmonary/Chest: Effort normal and breath sounds normal    Abdominal: Soft  Bowel sounds are normal  There is no tenderness  No CVA tenderness  Lymphadenopathy: she has no cervical adenopathy  Neurological: she is alert and oriented to person, place, and time  Skin: Skin is warm and dry  Psychiatric: she has a normal mood and affect   her behavior is normal

## 2022-08-09 NOTE — PATIENT INSTRUCTIONS
Bladder Irritants:  ·         Foods/Drinks that are bladder irritants:  ·         Drinks with caffeeine (soda, coffee, tea)  ·         Medicines with caffeine (exedrine, some over-the-counter cough meds)  ·         Carbonated drinks (soda, tonic water, fizzy water)  ·         Chocolate  ·         Alcohol  ·         Spicy foods  ·         Tomatoes and other high acid foods  ·         Sugar, honey, corn syrup  ·         Sugar substitutes made with aspartame (NutraSweet, Equal)  ·         Citrus fruits and drinks (stefani, oranges, grapefruit, pineapples, limes)  ·         Milk      To help your bladder:  Drink 6 - 8 oz of liquid each day  Water is best  Reducing the amount you drink can cause the lining of your bladder to become irritated  If you are drinking enough water your urine should be a pale yellow

## 2022-08-12 LAB
BACTERIA UR CULT: ABNORMAL
BACTERIA UR CULT: ABNORMAL

## 2022-09-23 ENCOUNTER — OFFICE VISIT (OUTPATIENT)
Dept: FAMILY MEDICINE CLINIC | Facility: CLINIC | Age: 27
End: 2022-09-23
Payer: COMMERCIAL

## 2022-09-23 VITALS
OXYGEN SATURATION: 100 % | WEIGHT: 129.2 LBS | DIASTOLIC BLOOD PRESSURE: 66 MMHG | HEART RATE: 82 BPM | RESPIRATION RATE: 14 BRPM | BODY MASS INDEX: 24.39 KG/M2 | TEMPERATURE: 97.2 F | HEIGHT: 61 IN | SYSTOLIC BLOOD PRESSURE: 110 MMHG

## 2022-09-23 DIAGNOSIS — F33.0 MDD (MAJOR DEPRESSIVE DISORDER), RECURRENT EPISODE, MILD (HCC): Primary | ICD-10-CM

## 2022-09-23 DIAGNOSIS — R41.840 INATTENTION: ICD-10-CM

## 2022-09-23 DIAGNOSIS — F41.9 ANXIETY: ICD-10-CM

## 2022-09-23 PROCEDURE — 99214 OFFICE O/P EST MOD 30 MIN: CPT | Performed by: FAMILY MEDICINE

## 2022-09-23 NOTE — PROGRESS NOTES
Jesus Fisher was seen today for depression and hm  Diagnoses and all orders for this visit:    MDD (major depressive disorder), recurrent episode, mild (HCC)  -     sertraline (ZOLOFT) 50 mg tablet; 1/2 tablet daily x 8 days then daily    Inattention    Anxiety  -     sertraline (ZOLOFT) 50 mg tablet; 1/2 tablet daily x 8 days then daily   mood not well controlled  Did not tolerate wellbutrin  Treatment options reviewed  Will start zoloft 15 mg x 8 days then 50 mg  Risks and benefits and side effects reviewed  consider GeneSight testing if not helping and if covered by her insurance  Schedule counseling with Coleen Lenz if covered by her insurance  Follow up in 6 weeks  Return in about 6 weeks (around 11/4/2022) for mood check   Subjective:   Jesus Fisher is a 33 y o  female here today for a follow-up on her current medical conditions:    Patient Active Problem List   Diagnosis    Cardiac murmur    Near syncope    Anxiety    MDD (major depressive disorder), recurrent episode, mild (HCC)    Migraine with aura and without status migrainosus, not intractable    Inattention    Perianal abscess       Patient Care Team:  Ioana Leroy DO as PCP - General (Family Medicine)  Irish Mayberry MD as Consulting Physician (Colon and Rectal Surgery)    Current Medications:  Current Outpatient Medications   Medication Sig Dispense Refill    sertraline (ZOLOFT) 50 mg tablet 1/2 tablet daily x 8 days then daily 30 tablet 5     No current facility-administered medications for this visit  HPI:  Chief Complaint   Patient presents with    Depression     One month f/u for mood check  Pt reports she stopped taking her Wellbutrin after one month due to side effects  Pt experienced extreme anxiety that was interrupting daily life  Would like to discuss other medications for treatment   HM     Pt is unsure if she has ever had the HPV vaccine  Pt declines third and fourth doses of covid vaccine at this time        -- Above per clinical staff and reviewed  --    PHQ-2/9 Depression Screening    Little interest or pleasure in doing things: 1 - several days  Feeling down, depressed, or hopeless: 0 - not at all  Trouble falling or staying asleep, or sleeping too much: 0 - not at all  Feeling tired or having little energy: 1 - several days  Poor appetite or overeatin - not at all  Feeling bad about yourself - or that you are a failure or have let yourself or your family down: 1 - several days  Trouble concentrating on things, such as reading the newspaper or watching television: 1 - several days  Moving or speaking so slowly that other people could have noticed  Or the opposite - being so fidgety or restless that you have been moving around a lot more than usual: 0 - not at all  Thoughts that you would be better off dead, or of hurting yourself in some way: 0 - not at all  PHQ-9 Score: 4   PHQ-9 Interpretation: No or Minimal depression        NATALIE-7 Flowsheet Screening    Flowsheet Row Most Recent Value   Over the last 2 weeks, how often have you been bothered by any of the following problems? Feeling nervous, anxious, or on edge 1   Not being able to stop or control worrying 1   Worrying too much about different things 1   Trouble relaxing 1   Being so restless that it is hard to sit still 0   Becoming easily annoyed or irritable 1   Feeling afraid as if something awful might happen 0   NATALIE-7 Total Score  referred to psych  inadddtion  started meds for anxiety/depression with wellbutrin  consider treatment for ADD at a later time  no ss of bipolar  PHA9=7, GAD7 = 8 + ASRS  started wellbutrin  consider prozac or addition of stimulant   PHQ9=7, GAD7=8    failed celexa (zombie like)   Today:  Tried wellbutrin but was not doing on this   Anxiety was just worse, grandfather    Felt much better when she stopped it   Focus was better   Psych waiting [employer] [facility]   Recently feeling more trouble more feeling depressed about where she is in life and crazy in her head  Used to go to counseling   Tried a few places a few months ago   Not sure is her new insurance covers mental health   Not using birth control right now  Royer king want to be pregnant  Considering IUD or Nexplanon  The following portions of the patient's history were reviewed and updated as appropriate: allergies, current medications, past family history, past medical history, past social history, past surgical history and problem list     Objective:  Vitals:  /66   Pulse 82   Temp (!) 97 2 °F (36 2 °C) (Temporal)   Resp 14   Ht 5' 1" (1 549 m)   Wt 58 6 kg (129 lb 3 2 oz)   SpO2 100%   BMI 24 41 kg/m²    Wt Readings from Last 3 Encounters:   09/23/22 58 6 kg (129 lb 3 2 oz)   09/07/22 57 2 kg (126 lb)   08/09/22 54 9 kg (121 lb)      BP Readings from Last 3 Encounters:   09/23/22 110/66   08/09/22 98/70   08/01/22 102/59        Review of Systems   She has no other concerns  No unexpected weight changes  No chest pain, SOB, or palpitations  No GERD  No changes in bowels or bladder  Sleeping well  + mood changes  Physical Exam   Constitutional:  she appears well-developed and well-nourished  HENT: Head: Normocephalic  Neck: Neck supple  Cardiovascular: Normal rate, regular rhythm and normal heart sounds  Pulmonary/Chest: Effort normal and breath sounds normal  No wheezes, rales, or rhonchi  Abdominal: Soft  Bowel sounds are normal  There is no tenderness  No hepatosplenomegaly  Musculoskeletal: she exhibits no edema  Lymphadenopathy: she has no cervical adenopathy  Neurological: she is alert and oriented to person, place, and time  Skin: Skin is warm and dry  Psychiatric: she has a normal mood and affect  her behavior is normal  Thought content normal        Depression Screening and Follow-up Plan: Patient assessed for underlying major depression   Brief counseling provided and recommend additional follow-up/re-evaluation next office visit

## 2022-10-07 ENCOUNTER — OFFICE VISIT (OUTPATIENT)
Dept: FAMILY MEDICINE CLINIC | Facility: CLINIC | Age: 27
End: 2022-10-07
Payer: COMMERCIAL

## 2022-10-07 VITALS
BODY MASS INDEX: 24.35 KG/M2 | RESPIRATION RATE: 16 BRPM | DIASTOLIC BLOOD PRESSURE: 62 MMHG | TEMPERATURE: 98 F | WEIGHT: 129 LBS | HEART RATE: 95 BPM | SYSTOLIC BLOOD PRESSURE: 100 MMHG | HEIGHT: 61 IN | OXYGEN SATURATION: 97 %

## 2022-10-07 DIAGNOSIS — J02.9 PHARYNGITIS, UNSPECIFIED ETIOLOGY: ICD-10-CM

## 2022-10-07 DIAGNOSIS — J02.9 SORE THROAT: Primary | ICD-10-CM

## 2022-10-07 LAB — S PYO AG THROAT QL: NEGATIVE

## 2022-10-07 PROCEDURE — 87880 STREP A ASSAY W/OPTIC: CPT | Performed by: FAMILY MEDICINE

## 2022-10-07 PROCEDURE — 87070 CULTURE OTHR SPECIMN AEROBIC: CPT | Performed by: FAMILY MEDICINE

## 2022-10-07 PROCEDURE — 99213 OFFICE O/P EST LOW 20 MIN: CPT | Performed by: FAMILY MEDICINE

## 2022-10-07 NOTE — PROGRESS NOTES
Assessment/Plan:   Jerry Avila was seen today for cold like symptoms  Diagnoses and all orders for this visit:    Sore throat    Pharyngitis, unspecified etiology  -     Throat culture; Future  -     POCT rapid strepA - negative   -     Throat culture    Viral likely   Will send culture - treat if positive   Otherwise rest, fluids  Continue over the counter supportive treatment   Call if lasts > 10 days or symptoms change or worsen  Recommend COVID and flu shot when feeling better     Patient Instructions   Take Ibuprofen 400 - 600 mg every 4 - 6 hours as needed for pain, with food  No follow-ups on file  Subjective:    HPI  Jerry Avila is a 33 y o  female who presents with:  Chief Complaint     Cold Like Symptoms        HPI     Cold Like Symptoms      Additional comments: Pt started with sore throat two days ago  Denies fevers  Productive cough and congestion  Pt has been using Mucinex OTC, not helping  Neg covid test this morning  Pt states she has white spots on the back of her throat  Does not have her tonsils, tonsillectomy as a child  Last edited by Tiny Metz LPN on 62/7/1219  7:91 PM  (History)        ---Above per clinical staff & reviewed  ---        Today:  Throat pain, really bad   Rates pain 5-6/10   Gargles salt water and mucinex   Congestion and cough better  Throat worse today   3 days now   No fevers   Swollen glands   No sick contacts  No body aches, no bowel changes  The following portions of the patient's history were reviewed and updated as appropriate: allergies, current medications, past family history, past medical history, past social history, past surgical history and problem list   Review of Systems  ROS:  all others negative - no chest pain, SOB, normal urine and bowels  no GERD  sleeping well  mood good  + sore throat, congestion     Objective:    /62   Pulse 95   Temp 98 °F (36 7 °C)   Resp 16   Ht 5' 1" (1 549 m)   Wt 58 5 kg (129 lb)   SpO2 97% BMI 24 37 kg/m²   Wt Readings from Last 3 Encounters:   10/07/22 58 5 kg (129 lb)   09/23/22 58 6 kg (129 lb 3 2 oz)   09/07/22 57 2 kg (126 lb)     BP Readings from Last 3 Encounters:   10/07/22 100/62   09/23/22 110/66   08/09/22 98/70       Current Medications:  Current Outpatient Medications   Medication Sig Dispense Refill    sertraline (ZOLOFT) 50 mg tablet 1/2 tablet daily x 8 days then daily 30 tablet 5     No current facility-administered medications for this visit  Physical Exam   Constitutional: she appears well-developed and well-nourished  HENT: Head: Normocephalic  Right Ear: External ear normal  Tympanic membrane normal    Left Ear: External ear normal  Tympanic membrane normal    Nose: Nose normal  No mucosal edema, No rhinorrhea  Right sinus exhibits no maxillary sinus tenderness  Left sinus exhibits no maxillary sinus tenderness  Mouth/Throat: Oropharynx is clear and moist  Pharynx erythema  No exudates appreciated  Eyes: Normal conjunctiva  No erythema  No discharge  Neck: No pain on exam  Neck supple  Cardiovascular: Normal rate, regular rhythm and normal heart sounds  Pulmonary/Chest: Effort normal and breath sounds normal    Abdominal: Soft  Bowel sounds are normal  There is no tenderness  Lymphadenopathy: she has + anterior cervical tender adenopathy  Neurological: she is alert and oriented to person, place, and time  Skin: Skin is warm and dry  Psychiatric: she has a normal mood and affect   her behavior is normal

## 2022-10-09 LAB — BACTERIA THROAT CULT: NORMAL

## 2023-01-14 ENCOUNTER — HOSPITAL ENCOUNTER (EMERGENCY)
Facility: HOSPITAL | Age: 28
Discharge: HOME | End: 2023-01-14
Attending: EMERGENCY MEDICINE

## 2023-01-14 VITALS
OXYGEN SATURATION: 100 % | BODY MASS INDEX: 25.24 KG/M2 | WEIGHT: 133.6 LBS | RESPIRATION RATE: 18 BRPM | HEART RATE: 88 BPM | DIASTOLIC BLOOD PRESSURE: 63 MMHG | TEMPERATURE: 98.7 F | SYSTOLIC BLOOD PRESSURE: 106 MMHG

## 2023-01-14 DIAGNOSIS — K08.89 DENTALGIA: Primary | ICD-10-CM

## 2023-01-14 DIAGNOSIS — R11.0 NAUSEA: ICD-10-CM

## 2023-01-14 DIAGNOSIS — K04.7 PERIAPICAL ABSCESS: ICD-10-CM

## 2023-01-14 DIAGNOSIS — K04.7 DENTAL INFECTION: ICD-10-CM

## 2023-01-14 RX ORDER — ONDANSETRON 2 MG/ML
4 INJECTION INTRAMUSCULAR; INTRAVENOUS ONCE
Status: COMPLETED | OUTPATIENT
Start: 2023-01-14 | End: 2023-01-14

## 2023-01-14 RX ORDER — ONDANSETRON 4 MG/1
4 TABLET, FILM COATED ORAL EVERY 8 HOURS PRN
Qty: 15 TABLET | Refills: 0 | Status: SHIPPED | OUTPATIENT
Start: 2023-01-14 | End: 2023-01-21

## 2023-01-14 RX ORDER — AMOXICILLIN AND CLAVULANATE POTASSIUM 875; 125 MG/1; MG/1
1 TABLET, FILM COATED ORAL ONCE
Status: COMPLETED | OUTPATIENT
Start: 2023-01-14 | End: 2023-01-14

## 2023-01-14 RX ORDER — KETOROLAC TROMETHAMINE 30 MG/ML
15 INJECTION, SOLUTION INTRAMUSCULAR; INTRAVENOUS ONCE
Status: COMPLETED | OUTPATIENT
Start: 2023-01-14 | End: 2023-01-14

## 2023-01-14 RX ORDER — AMOXICILLIN AND CLAVULANATE POTASSIUM 875; 125 MG/1; MG/1
1 TABLET, FILM COATED ORAL EVERY 12 HOURS
Qty: 13 TABLET | Refills: 0 | Status: SHIPPED | OUTPATIENT
Start: 2023-01-14 | End: 2023-01-21

## 2023-01-14 RX ADMIN — ONDANSETRON 4 MG: 2 INJECTION INTRAMUSCULAR; INTRAVENOUS at 03:27

## 2023-01-14 RX ADMIN — KETOROLAC TROMETHAMINE 15 MG: 30 INJECTION, SOLUTION INTRAMUSCULAR; INTRAVENOUS at 03:27

## 2023-01-14 RX ADMIN — MORPHINE SULFATE 2 MG: 2 INJECTION, SOLUTION INTRAMUSCULAR; INTRAVENOUS at 04:56

## 2023-01-14 RX ADMIN — AMOXICILLIN AND CLAVULANATE POTASSIUM 1 TABLET: 875; 125 TABLET, FILM COATED ORAL at 04:58

## 2023-01-14 NOTE — ED PROVIDER NOTES
History  Chief Complaint   Patient presents with   • Dental Pain     Pt reports cracking tooth about a month ago and believes there is an infection starting in mouth  States L side of her face is now "puffy" and reports inability to eat without pain  -fevers/chills     HPI patient is a 36-year-old female presenting with about a month of left first premolar pain with small apical abscess  The pain is now present above her left upper incisor  The pain radiates up into her head and woke her from sleep to the pain  Has been nauseated  Has a plan with both her dentist and oral surgeon for root canal, has dental insurance that was just recently activated  No fevers and no chills  Denies current pregnancy  She has been unable to eat without pain  Patient has not had any antibiotics recently  She states that she has a lot of dental work that needs to be done and they are currently scheduling her for several procedures  Prior to Admission Medications   Prescriptions Last Dose Informant Patient Reported?  Taking?   sertraline (ZOLOFT) 50 mg tablet   No No   Si/2 tablet daily x 8 days then daily      Facility-Administered Medications: None       Past Medical History:   Diagnosis Date   • Anxiety    • COVID 2021   • Depression    • Headache    • Heart murmur    • Migraine    • Perianal abscess        Past Surgical History:   Procedure Laterality Date   • COLONOSCOPY     • OR ANRCT XM SURG REQ ANES GENERAL SPI/EDRL DX N/A 2022    Procedure: EXAM UNDER ANESTHESIA (EUA) SUBCUTANEOUS FISTULOTOMY;  Surgeon: Mariposa Hernandez MD;  Location: [facility] MAIN [facility];  Service: Colorectal   • TONSILLECTOMY         Family History   Problem Relation Age of Onset   • Other Mother         KIDNEY MASS BIOPSY NO CANCER    • Anxiety disorder Father    • Bipolar disorder Father    • Suicide Attempts Father    • Alcohol abuse Father    • Anxiety disorder Sister    • Bipolar disorder Sister    • Depression Sister    • Anxiety disorder Brother         Celexa   • Depression Brother    • No Known Problems Maternal Grandmother    • Leukemia Maternal Grandfather    • No Known Problems Paternal Grandmother    • Anuerysm Paternal Grandfather         BRAIN    • Dementia Paternal Grandfather    • Prostate cancer Other    • Colon cancer Other    • Hypertension Other    • Hyperlipidemia Other      I have reviewed and agree with the history as documented  E-Cigarette/Vaping   • E-Cigarette Use Current Every Day User    • Start Date 19      E-Cigarette/Vaping Substances   • Nicotine Yes      Social History     Tobacco Use   • Smoking status: Former     Packs/day: 0 00     Years: 5 00     Pack years: 0 00     Types: Cigarettes     Start date: 3/15/2014     Quit date: 2018     Years since quittin 5   • Smokeless tobacco: Never   Vaping Use   • Vaping Use: Every day   • Start date: 2018   • Substances: Nicotine   Substance Use Topics   • Alcohol use: Yes     Alcohol/week: 1 0 standard drink     Types: 1 Cans of beer per week     Comment: Socially; 1-2x wk   • Drug use: Not Currently     Types: Marijuana     Comment: history of        Review of Systems   Constitutional: Negative for chills and fever  HENT: Positive for dental problem, mouth sores, sinus pressure and sinus pain  Negative for congestion, drooling, ear discharge, ear pain, facial swelling, hearing loss, nosebleeds, postnasal drip, rhinorrhea, sneezing, sore throat, tinnitus, trouble swallowing and voice change  Eyes: Negative for photophobia, pain, redness and visual disturbance  Respiratory: Negative for apnea, cough, choking, chest tightness, shortness of breath, wheezing and stridor  Cardiovascular: Negative for chest pain, palpitations and leg swelling  Gastrointestinal: Positive for nausea  Negative for abdominal distention, abdominal pain, constipation, diarrhea and vomiting     Genitourinary: Negative for difficulty urinating, dysuria and flank pain    Musculoskeletal: Negative for arthralgias, back pain, joint swelling, neck pain and neck stiffness  Skin: Negative for pallor and rash  Neurological: Positive for headaches  Negative for dizziness, tremors, seizures, syncope, facial asymmetry, speech difficulty, weakness, light-headedness and numbness  Psychiatric/Behavioral: Negative for confusion  All other systems reviewed and are negative  Physical Exam  ED Triage Vitals   Temperature Pulse Respirations Blood Pressure SpO2   01/14/23 0237 01/14/23 0237 01/14/23 0237 01/14/23 0237 01/14/23 0237   98 7 °F (37 1 °C) 101 18 131/74 100 %      Temp src Heart Rate Source Patient Position - Orthostatic VS BP Location FiO2 (%)   -- 01/14/23 0237 01/14/23 0237 01/14/23 0237 --    Monitor Sitting Right arm       Pain Score       01/14/23 0252       6             Orthostatic Vital Signs  Vitals:    01/14/23 0237 01/14/23 0522   BP: 131/74 106/63   Pulse: 101 88   Patient Position - Orthostatic VS: Sitting        Physical Exam  Vitals and nursing note reviewed  Constitutional:       General: She is in acute distress (mild due to pain)  Appearance: She is not ill-appearing, toxic-appearing or diaphoretic  HENT:      Head: Normocephalic and atraumatic  Nose: Nose normal  No congestion or rhinorrhea  Mouth/Throat:      Mouth: Mucous membranes are moist       Pharynx: Posterior oropharyngeal erythema (small apical abscess above left 1st premolar) present  No oropharyngeal exudate  Eyes:      General: No scleral icterus  Right eye: No discharge  Left eye: No discharge  Extraocular Movements: Extraocular movements intact  Conjunctiva/sclera: Conjunctivae normal       Pupils: Pupils are equal, round, and reactive to light  Neck:      Vascular: No carotid bruit  Cardiovascular:      Rate and Rhythm: Normal rate and regular rhythm  Pulses: Normal pulses  Heart sounds: No murmur heard  No friction rub  No gallop  Pulmonary:      Effort: Pulmonary effort is normal  No respiratory distress  Breath sounds: Normal breath sounds  No stridor  No wheezing, rhonchi or rales  Chest:      Chest wall: No tenderness  Abdominal:      General: Abdomen is flat  There is no distension  Palpations: Abdomen is soft  Tenderness: There is no abdominal tenderness  There is no guarding  Musculoskeletal:         General: No swelling, tenderness, deformity or signs of injury  Normal range of motion  Cervical back: Normal range of motion and neck supple  No rigidity or tenderness  Right lower leg: No edema  Left lower leg: No edema  Lymphadenopathy:      Cervical: No cervical adenopathy  Skin:     General: Skin is warm and dry  Coloration: Skin is not jaundiced or pale  Findings: No bruising, erythema, lesion or rash  Neurological:      General: No focal deficit present  Mental Status: She is alert and oriented to person, place, and time  Motor: No weakness  Psychiatric:         Mood and Affect: Mood normal          Behavior: Behavior normal          ED Medications  Medications   ketorolac (TORADOL) injection 15 mg (15 mg Intravenous Given 1/14/23 0327)   ondansetron (ZOFRAN) injection 4 mg (4 mg Intravenous Given 1/14/23 0327)   amoxicillin-clavulanate (AUGMENTIN) 875-125 mg per tablet 1 tablet (1 tablet Oral Given 1/14/23 0458)   morphine injection 2 mg (2 mg Intravenous Given 1/14/23 0456)       Diagnostic Studies  Results Reviewed     None                 No orders to display         Procedures  Procedures      ED Course     Pt's pain is controlled after toradol/morphine  Nausea controlled with zofran  1st dose of augmentin given here   She is agreeable to plan for discharge with 6 more days of augmentin BID and follow-up as scheduled with dentist  Discussed return precautions including severe headaches, severe pain, neurologic changes, throat swelling, mouth swelling/induration, fevers, dyspnea, etc  Rx: zofran, augmentin  She will continue taking tylenol and motrin for pain  SBIRT 22yo+    Flowsheet Row Most Recent Value   SBIRT (25 yo +)    In order to provide better care to our patients, we are screening all of our patients for alcohol and drug use  Would it be okay to ask you these screening questions? No Filed at: 01/14/2023 8111   MIR: How many times in the past year have you    Used an illegal drug or used a prescription medication for non-medical reasons? Never Filed at: 01/14/2023 1180                MDM  Dental abscess  Dentalgia  Dental infection  Do not suspect venous sinus thrombosis, lyle's angina      Disposition  Final diagnoses:   [address] infection   Periapical abscess   Nausea     Time reflects when diagnosis was documented in both MDM as applicable and the Disposition within this note     Time User Action Codes Description Comment    1/14/2023  5:22 AM Collie Aarti Add [K08 89] Power Fieldses     1/14/2023  5:22 AM Collie Aarti Add [K04 7] Dental infection     1/14/2023  5:22 AM Collie Aarti Add [K04 7] Periapical abscess     1/14/2023  5:25 AM Collie Parkton Add [R11 0] Nausea       ED Disposition     ED Disposition   Discharge    Condition   Stable    Date/Time   Sat Jan 14, 2023  5:19 AM    Comment   Judy George discharge to home/self care  Follow-up Information     Follow up With Specialties Details Why Contact Info Additional Information    Kaleb Lima, DO Family Medicine  As needed [address]    [address] [facility] Emergency Department Emergency Medicine  As needed [address] Emergency Department, [address], [place], [place], [address]          Discharge Medication List as of 1/14/2023  5:26 AM START taking these medications    Details   amoxicillin-clavulanate (AUGMENTIN) 875-125 mg per tablet Take 1 tablet by mouth every 12 (twelve) hours for 7 days 1st dose given in ED , Starting Sat 1/14/2023, Until Sat 1/21/2023, Normal      ondansetron (Zofran) 4 mg tablet Take 1 tablet (4 mg total) by mouth every 8 (eight) hours as needed for nausea or vomiting for up to 7 days, Starting Sat 1/14/2023, Until Sat 1/21/2023 at 2359, Normal         CONTINUE these medications which have NOT CHANGED    Details   sertraline (ZOLOFT) 50 mg tablet 1/2 tablet daily x 8 days then daily, Normal           No discharge procedures on file  PDMP Review       Value Time User    PDMP Reviewed  Yes 8/1/2022  3:50 PM Denise Vizcarra MD           ED Provider  Attending physically available and evaluated Darrius Roberts I managed the patient along with the ED Attending      Electronically Signed by         Charito Carlos DO  01/15/23 3173

## 2023-01-14 NOTE — ED ATTENDING ATTESTATION
1/14/2023  IPetros MD, saw and evaluated the patient  I have discussed the patient with the resident/non-physician practitioner and agree with the resident's/non-physician practitioner's findings, Plan of Care, and MDM as documented in the resident's/non-physician practitioner's note, except where noted  All available labs and Radiology studies were reviewed  I was present for key portions of any procedure(s) performed by the resident/non-physician practitioner and I was immediately available to provide assistance  At this point I agree with the current assessment done in the Emergency Department  I have conducted an independent evaluation of this patient a history and physical is as follows:    ED Course         Critical Care Time  Procedures    Patient left upper dental pain  No systemic fevers, chills, sweats  No focal neurological defects  No visual disturbance  No hearing loss/tinnitus/vertigo  No pain behind the ear  No parotid gland tenderness  No neck pain or trouble swallowing  No deviation of the tonsils to suggest peritonsillar abscess  No obvious drainable intraoral abscess  No facial rash or blisters  No woodiness or swelling underneath the tongue  No claudication when chewing  No headache  MDM dental pain, will treat

## 2023-01-14 NOTE — DISCHARGE INSTRUCTIONS
Follow-up with your dentist/oral surgeon as scheduled, may try calling for a quicker appointment  Continue taking augmentin for the next 6 days  Return to the ED with fevers, uncontrollable pain, severe headache, trouble breathing, throat swelling as we discussed  Continue taking 800 mg of motrin with  food and tylenol

## 2023-01-18 ENCOUNTER — TELEPHONE (OUTPATIENT)
Dept: PSYCHIATRY | Facility: CLINIC | Age: 28
End: 2023-01-18

## 2023-01-18 NOTE — TELEPHONE ENCOUNTER
Sent Wait List Letter VIA [employer]   Verify patients need of services from wait list after 10 days   If no communication remove from wait list

## 2023-08-05 ENCOUNTER — HOSPITAL ENCOUNTER (EMERGENCY)
Facility: HOSPITAL | Age: 28
Discharge: HOME | End: 2023-08-05
Attending: EMERGENCY MEDICINE
Payer: COMMERCIAL

## 2023-08-05 VITALS
HEART RATE: 71 BPM | RESPIRATION RATE: 16 BRPM | DIASTOLIC BLOOD PRESSURE: 67 MMHG | TEMPERATURE: 98.1 F | OXYGEN SATURATION: 100 % | SYSTOLIC BLOOD PRESSURE: 135 MMHG

## 2023-08-05 DIAGNOSIS — M54.9 BACK PAIN: Primary | ICD-10-CM

## 2023-08-05 LAB
ALBUMIN SERPL BCP-MCNC: 4.4 G/DL (ref 3.5–5)
ALP SERPL-CCNC: 48 U/L (ref 34–104)
ALT SERPL W P-5'-P-CCNC: 11 U/L (ref 7–52)
ANION GAP SERPL CALCULATED.3IONS-SCNC: 6 MMOL/L
AST SERPL W P-5'-P-CCNC: 12 U/L (ref 13–39)
BASOPHILS # BLD AUTO: 0.08 THOUSANDS/ÂΜL (ref 0–0.1)
BASOPHILS NFR BLD AUTO: 1 % (ref 0–1)
BILIRUB SERPL-MCNC: 0.85 MG/DL (ref 0.2–1)
BILIRUB UR QL STRIP: NEGATIVE
BUN SERPL-MCNC: 8 MG/DL (ref 5–25)
CALCIUM SERPL-MCNC: 8.9 MG/DL (ref 8.4–10.2)
CHLORIDE SERPL-SCNC: 106 MMOL/L (ref 96–108)
CLARITY UR: CLEAR
CO2 SERPL-SCNC: 26 MMOL/L (ref 21–32)
COLOR UR: COLORLESS
CREAT SERPL-MCNC: 0.72 MG/DL (ref 0.6–1.3)
EOSINOPHIL # BLD AUTO: 0.27 THOUSAND/ÂΜL (ref 0–0.61)
EOSINOPHIL NFR BLD AUTO: 3 % (ref 0–6)
ERYTHROCYTE [DISTWIDTH] IN BLOOD BY AUTOMATED COUNT: 12.1 % (ref 11.6–15.1)
EXT PREGNANCY TEST URINE: NEGATIVE
EXT. CONTROL: NORMAL
GFR SERPL CREATININE-BSD FRML MDRD: 115 ML/MIN/1.73SQ M
GLUCOSE SERPL-MCNC: 86 MG/DL (ref 65–140)
GLUCOSE UR STRIP-MCNC: NEGATIVE MG/DL
HCT VFR BLD AUTO: 44.6 % (ref 34.8–46.1)
HGB BLD-MCNC: 14.9 G/DL (ref 11.5–15.4)
HGB UR QL STRIP.AUTO: NEGATIVE
IMM GRANULOCYTES # BLD AUTO: 0.02 THOUSAND/UL (ref 0–0.2)
IMM GRANULOCYTES NFR BLD AUTO: 0 % (ref 0–2)
KETONES UR STRIP-MCNC: NEGATIVE MG/DL
LEUKOCYTE ESTERASE UR QL STRIP: NEGATIVE
LYMPHOCYTES # BLD AUTO: 2.46 THOUSANDS/ÂΜL (ref 0.6–4.47)
LYMPHOCYTES NFR BLD AUTO: 31 % (ref 14–44)
MCH RBC QN AUTO: 30 PG (ref 26.8–34.3)
MCHC RBC AUTO-ENTMCNC: 33.4 G/DL (ref 31.4–37.4)
MCV RBC AUTO: 90 FL (ref 82–98)
MONOCYTES # BLD AUTO: 0.45 THOUSAND/ÂΜL (ref 0.17–1.22)
MONOCYTES NFR BLD AUTO: 6 % (ref 4–12)
NEUTROPHILS # BLD AUTO: 4.76 THOUSANDS/ÂΜL (ref 1.85–7.62)
NEUTS SEG NFR BLD AUTO: 59 % (ref 43–75)
NITRITE UR QL STRIP: NEGATIVE
NRBC BLD AUTO-RTO: 0 /100 WBCS
PH UR STRIP.AUTO: 6.5 [PH]
PLATELET # BLD AUTO: 279 THOUSANDS/UL (ref 149–390)
PMV BLD AUTO: 9.3 FL (ref 8.9–12.7)
POTASSIUM SERPL-SCNC: 4 MMOL/L (ref 3.5–5.3)
PROT SERPL-MCNC: 7 G/DL (ref 6.4–8.4)
PROT UR STRIP-MCNC: NEGATIVE MG/DL
RBC # BLD AUTO: 4.96 MILLION/UL (ref 3.81–5.12)
SODIUM SERPL-SCNC: 138 MMOL/L (ref 135–147)
SP GR UR STRIP.AUTO: 1.01 (ref 1–1.03)
UROBILINOGEN UR STRIP-ACNC: <2 MG/DL
WBC # BLD AUTO: 8.04 THOUSAND/UL (ref 4.31–10.16)

## 2023-08-05 PROCEDURE — 36415 COLL VENOUS BLD VENIPUNCTURE: CPT

## 2023-08-05 PROCEDURE — 85025 COMPLETE CBC W/AUTO DIFF WBC: CPT

## 2023-08-05 PROCEDURE — 81003 URINALYSIS AUTO W/O SCOPE: CPT

## 2023-08-05 PROCEDURE — 80053 COMPREHEN METABOLIC PANEL: CPT

## 2023-08-05 PROCEDURE — 81025 URINE PREGNANCY TEST: CPT

## 2023-08-05 PROCEDURE — 99284 EMERGENCY DEPT VISIT MOD MDM: CPT

## 2023-08-05 RX ORDER — CYCLOBENZAPRINE HCL 10 MG
10 TABLET ORAL 2 TIMES DAILY PRN
Qty: 20 TABLET | Refills: 0 | Status: SHIPPED | OUTPATIENT
Start: 2023-08-05

## 2023-08-05 RX ORDER — LIDOCAINE 50 MG/G
1 PATCH TOPICAL ONCE
Status: DISCONTINUED | OUTPATIENT
Start: 2023-08-05 | End: 2023-08-05 | Stop reason: HOSPADM

## 2023-08-05 RX ADMIN — LIDOCAINE 5% 1 PATCH: 700 PATCH TOPICAL at 15:13

## 2023-08-05 NOTE — ED PROVIDER NOTES
History  Chief Complaint   Patient presents with   • Flank Pain     L flank pain onset 5 weeks ago after moving heavy moving     Cathy Penny is a 81-year-old female with no pertinent past medical history presenting to the ED for left-sided rib/flank pain times about 3 to 4 weeks. Initially attributed it to heavy lifting as as what she was doing prior to onset, however it is worsening over the past few days or even doing the simplest of daily tasks are causing pain to include standing, laughing. Pain is worse with movement. Pain is lessened when she is standing or sitting still and straight. Does not like to take medication and has not tried any. Mother has a history of a kidney tumor that required a total nephrectomy so she refrains from taking ibuprofen and other NSAIDs. It is believed this is a hereditary condition but does not follow with a specialist.  The pain is described as a jabbing without radiation. It is constantly there but the intensity is transient. 1/10 reading when sitting but if coughing, laughing, moving it goes up to a 6-7/10. No bowel or bladder incontinence, saddle anesthesia, IV drug use, fevers. Prior to Admission Medications   Prescriptions Last Dose Informant Patient Reported?  Taking?   ondansetron (Zofran) 4 mg tablet   No No   Sig: Take 1 tablet (4 mg total) by mouth every 8 (eight) hours as needed for nausea or vomiting for up to 7 days   sertraline (ZOLOFT) 50 mg tablet   No No   Si/2 tablet daily x 8 days then daily      Facility-Administered Medications: None       Past Medical History:   Diagnosis Date   • Anxiety    • COVID 2021   • Depression    • Headache    • Heart murmur    • Migraine    • Perianal abscess        Past Surgical History:   Procedure Laterality Date   • COLONOSCOPY     • [place] Tameka Abram SURG REQ ANES GENERAL SPI/EDRL DX N/A 2022    Procedure: EXAM UNDER ANESTHESIA (EUA) SUBCUTANEOUS FISTULOTOMY;  Surgeon: Jessie Valerio MD; Location: [facility] MAIN OR;  Service: Colorectal   • TONSILLECTOMY         Family History   Problem Relation Age of Onset   • Other Mother         KIDNEY MASS BIOPSY NO CANCER    • Anxiety disorder Father    • Bipolar disorder Father    • Suicide Attempts Father    • Alcohol abuse Father    • Anxiety disorder Sister    • Bipolar disorder Sister    • Depression Sister    • Anxiety disorder Brother         Celexa   • Depression Brother    • No Known Problems Maternal Grandmother    • Leukemia Maternal Grandfather    • No Known Problems Paternal Grandmother    • Anuerysm Paternal Grandfather         BRAIN    • Dementia Paternal Grandfather    • Prostate cancer Other    • Colon cancer Other    • Hypertension Other    • Hyperlipidemia Other      I have reviewed and agree with the history as documented. E-Cigarette/Vaping   • E-Cigarette Use Current Every Day User    • Start Date 19      E-Cigarette/Vaping Substances   • Nicotine Yes      Social History     Tobacco Use   • Smoking status: Former     Packs/day: 0.00     Years: 5.00     Total pack years: 0.00     Types: Cigarettes     Start date: 3/15/2014     Quit date: 2018     Years since quittin.1   • Smokeless tobacco: Never   Vaping Use   • Vaping Use: Every day   • Start date: 2018   • Substances: Nicotine   Substance Use Topics   • Alcohol use: Yes     Alcohol/week: 1.0 standard drink of alcohol     Types: 1 Cans of beer per week     Comment: Socially; 1-2x wk   • Drug use: Not Currently     Types: Marijuana     Comment: history of       Review of Systems   Constitutional: Negative for chills and fever. Eyes: Negative for photophobia and visual disturbance. Respiratory: Negative for cough and shortness of breath. Cardiovascular: Negative for chest pain, palpitations and leg swelling. Gastrointestinal: Negative for abdominal pain, nausea and vomiting. Genitourinary: Positive for flank pain.  Negative for difficulty urinating, dysuria, frequency, hematuria, urgency, vaginal bleeding and vaginal discharge. Musculoskeletal: Negative for back pain, neck pain and neck stiffness. Skin: Negative for rash. Neurological: Positive for headaches (migraine condition). Negative for light-headedness. Physical Exam  Physical Exam  Vitals and nursing note reviewed. Constitutional:       General: She is not in acute distress. Appearance: Normal appearance. She is well-developed. She is not ill-appearing, toxic-appearing or diaphoretic. HENT:      Head: Normocephalic and atraumatic. Right Ear: External ear normal.      Left Ear: External ear normal.      Nose: Nose normal.   Eyes:      General: No scleral icterus. Right eye: No discharge. Left eye: No discharge. Extraocular Movements: Extraocular movements intact. Conjunctiva/sclera: Conjunctivae normal.   Cardiovascular:      Rate and Rhythm: Normal rate and regular rhythm. Pulses: Normal pulses. Heart sounds: Normal heart sounds. No murmur heard. Pulmonary:      Effort: Pulmonary effort is normal. No respiratory distress. Breath sounds: Normal breath sounds. Abdominal:      Palpations: Abdomen is soft. Tenderness: There is no abdominal tenderness. There is no right CVA tenderness or left CVA tenderness. Musculoskeletal:         General: No swelling. Normal range of motion. Cervical back: Normal, normal range of motion and neck supple. No bony tenderness. Thoracic back: Spasms and tenderness present. No edema, signs of trauma or bony tenderness. Lumbar back: Spasms and tenderness present. No swelling or bony tenderness. Normal range of motion. Back:       Comments: Pain is reproducible to palpation. No CVA tenderness. Skin:     General: Skin is warm and dry. Capillary Refill: Capillary refill takes less than 2 seconds. Neurological:      General: No focal deficit present. Mental Status: She is alert. Sensory: Sensation is intact. Motor: Motor function is intact. No weakness. Gait: Gait is intact.    Psychiatric:         Mood and Affect: Mood normal.         Behavior: Behavior normal.         Vital Signs  ED Triage Vitals [08/05/23 1210]   Temperature Pulse Respirations Blood Pressure SpO2   98.1 °F (36.7 °C) 71 16 135/67 100 %      Temp Source Heart Rate Source Patient Position - Orthostatic VS BP Location FiO2 (%)   Oral Monitor Sitting Left arm --      Pain Score       2           Vitals:    08/05/23 1210   BP: 135/67   Pulse: 71   Patient Position - Orthostatic VS: Sitting         Visual Acuity      ED Medications  Medications - No data to display    Diagnostic Studies  Results Reviewed     Procedure Component Value Units Date/Time    Comprehensive metabolic panel [[id number]]  (Abnormal) Collected: 08/05/23 1328    Lab Status: Final result Specimen: Blood from Arm, Right Updated: 08/05/23 1400     Sodium 138 mmol/L      Potassium 4.0 mmol/L      Chloride 106 mmol/L      CO2 26 mmol/L      ANION GAP 6 mmol/L      BUN 8 mg/dL      Creatinine 0.72 mg/dL      Glucose 86 mg/dL      Calcium 8.9 mg/dL      AST 12 U/L      ALT 11 U/L      Alkaline Phosphatase 48 U/L      Total Protein 7.0 g/dL      Albumin 4.4 g/dL      Total Bilirubin 0.85 mg/dL      eGFR 115 ml/min/1.73sq m     Narrative:      [place] guidelines for Chronic Kidney Disease (CKD):   •  Stage 1 with normal or high GFR (GFR > 90 mL/min/1.73 square meters)  •  Stage 2 Mild CKD (GFR = 60-89 mL/min/1.73 square meters)  •  Stage 3A Moderate CKD (GFR = 45-59 mL/min/1.73 square meters)  •  Stage 3B Moderate CKD (GFR = 30-44 mL/min/1.73 square meters)  •  Stage 4 Severe CKD (GFR = 15-29 mL/min/1.73 square meters)  •  Stage 5 End Stage CKD (GFR <15 mL/min/1.73 square meters)  Note: GFR calculation is accurate only with a steady state creatinine    UA w Reflex to Microscopic w Reflex to Culture [[id number]] Collected: 08/05/23 1331    Lab Status: Final result Specimen: Urine, Clean Catch Updated: 08/05/23 1344     Color, UA Colorless     Clarity, UA Clear     Specific Gravity, UA 1.007     pH, UA 6.5     Leukocytes, UA Negative     Nitrite, UA Negative     Protein, UA Negative mg/dl      Glucose, UA Negative mg/dl      Ketones, UA Negative mg/dl      Urobilinogen, UA <2.0 mg/dl      Bilirubin, UA Negative     Occult Blood, UA Negative    POCT pregnancy, urine [[id number]]  (Normal) Resulted: 08/05/23 1339    Lab Status: Final result Updated: 08/05/23 1339     EXT Preg Test, Ur Negative     Control Valid    CBC and differential [[id number]] Collected: 08/05/23 1328    Lab Status: Final result Specimen: Blood from Arm, Right Updated: 08/05/23 1336     WBC 8.04 Thousand/uL      RBC 4.96 Million/uL      Hemoglobin 14.9 g/dL      Hematocrit 44.6 %      MCV 90 fL      MCH 30.0 pg      MCHC 33.4 g/dL      RDW 12.1 %      MPV 9.3 fL      Platelets 586 Thousands/uL      nRBC 0 /100 WBCs      Neutrophils Relative 59 %      Immat GRANS % 0 %      Lymphocytes Relative 31 %      Monocytes Relative 6 %      Eosinophils Relative 3 %      Basophils Relative 1 %      Neutrophils Absolute 4.76 Thousands/µL      Immature Grans Absolute 0.02 Thousand/uL      Lymphocytes Absolute 2.46 Thousands/µL      Monocytes Absolute 0.45 Thousand/µL      Eosinophils Absolute 0.27 Thousand/µL      Basophils Absolute 0.08 Thousands/µL                  No orders to display              Procedures  Procedures         ED Course  ED Course as of 08/05/23 2114   Sat Aug 05, 2023   1341 PREGNANCY TEST URINE: Negative   1341 CBC unremarkable. 1347 UA unremarkable. SBIRT 22yo+    Flowsheet Row Most Recent Value   Initial Alcohol Screen: US AUDIT-C     1. How often do you have a drink containing alcohol? 0 Filed at: 08/05/2023 1456   2. How many drinks containing alcohol do you have on a typical day you are drinking?   0 Filed at: 08/05/2023 1456   3a. Male UNDER 65: How often do you have five or more drinks on one occasion? 0 Filed at: 08/05/2023 1456   3b. FEMALE Any Age, or MALE 65+: How often do you have 4 or more drinks on one occassion? 0 Filed at: 08/05/2023 1456   Audit-C Score 0 Filed at: 08/05/2023 1456   MIR: How many times in the past year have you. .. Used an illegal drug or used a prescription medication for non-medical reasons? Never Filed at: 08/05/2023 1456                    Medical Decision Making  20-year-old female presenting with left-sided back/thoracic pain x3 to 4 weeks after moving heavy objects. Physical exam is with point tenderness to the musculature surrounding the lateral ribs and back extending into the flank, but no CVA tenderness directly. No red flag signs for back pain to include but not limited to saddle anesthesia, bowel bladder incontinence, fevers, IV drug use. Due to family history obtained basic lab work which resulted unremarkable for any kidney origin suspicion. Offered an ultrasound for the kidneys but with shared decision making this was declined. Lidoderm patch placed, recommended follow-up with the family care doctor. Supportive care discussed. Muscle relaxer sent to pharmacy. Pt stable at time of discharge, vital signs reviewed, questions answered. Strict ER return precautions provided/discussed and were well understood by patient. A verbal dictation device was used in the writing of this note. Please excuse any grammatical errors or transposition of look a like/sound a like words. Back pain: acute illness or injury  Amount and/or Complexity of Data Reviewed  Labs: ordered. Decision-making details documented in ED Course. Risk  Prescription drug management.           Disposition  Final diagnoses:   Back pain     Time reflects when diagnosis was documented in both MDM as applicable and the Disposition within this note     Time User Action Codes Description Comment    8/5/2023  2:59 PM Catarina Kimberly [M54.9] Back pain       ED Disposition     ED Disposition   Discharge    Condition   Stable    Date/Time   Sat Aug 5, 2023  2:58 PM    Comment   Kaylee Blind discharge to home/self care. Follow-up Information     Follow up With Specialties Details Why Contact Info Additional Information    Ena Cortez DO Family Medicine Schedule an appointment as soon as possible for a visit  As needed [address] [facility]. [address] [place] [address] [address] Emergency Department Emergency Medicine Go to  If symptoms worsen [address] [facility], [place], [place], [facility]          Discharge Medication List as of 8/5/2023  3:01 PM      START taking these medications    Details   cyclobenzaprine (FLEXERIL) 10 mg tablet Take 1 tablet (10 mg total) by mouth 2 (two) times a day as needed for muscle spasms, Starting Sat 8/5/2023, Normal         CONTINUE these medications which have NOT CHANGED    Details   ondansetron (Zofran) 4 mg tablet Take 1 tablet (4 mg total) by mouth every 8 (eight) hours as needed for nausea or vomiting for up to 7 days, Starting Sat 1/14/2023, Until Sat 1/21/2023 at 2359, Normal      sertraline (ZOLOFT) 50 mg tablet 1/2 tablet daily x 8 days then daily, Normal             No discharge procedures on file.     PDMP Review       Value Time User    PDMP Reviewed  Yes 8/1/2022  3:50 PM Haleigh Price MD          ED Provider  Electronically Signed by           Arvind Nathan PA-C  08/05/23 2114       Arvind Nathan PA-C  08/05/23 2114

## 2023-08-05 NOTE — DISCHARGE INSTRUCTIONS
Take Flexeril as needed up to 2 times per day for muscle spasm relief. Do not drive while taking this as it may make you drowsy. Apply heat compresses for further relief, take Tylenol, lightly stretch. Return to the ER for: Fevers, worsening pain, unable to feel the legs, unable to move the legs, difficulty breathing, chest pain, bowel or bladder incontinence, worsening or concerning symptoms overall.

## 2023-08-25 ENCOUNTER — OFFICE VISIT (OUTPATIENT)
Dept: OBGYN CLINIC | Facility: CLINIC | Age: 28
End: 2023-08-25
Payer: COMMERCIAL

## 2023-08-25 VITALS
DIASTOLIC BLOOD PRESSURE: 72 MMHG | WEIGHT: 138.6 LBS | HEIGHT: 61 IN | BODY MASS INDEX: 26.17 KG/M2 | SYSTOLIC BLOOD PRESSURE: 114 MMHG

## 2023-08-25 DIAGNOSIS — Z01.419 ENCOUNTER FOR GYNECOLOGICAL EXAMINATION (GENERAL) (ROUTINE) WITHOUT ABNORMAL FINDINGS: Primary | ICD-10-CM

## 2023-08-25 DIAGNOSIS — Z11.3 SCREENING FOR STD (SEXUALLY TRANSMITTED DISEASE): ICD-10-CM

## 2023-08-25 PROCEDURE — 87591 N.GONORRHOEAE DNA AMP PROB: CPT | Performed by: OBSTETRICS & GYNECOLOGY

## 2023-08-25 PROCEDURE — 87491 CHLMYD TRACH DNA AMP PROBE: CPT | Performed by: OBSTETRICS & GYNECOLOGY

## 2023-08-25 PROCEDURE — 99395 PREV VISIT EST AGE 18-39: CPT | Performed by: OBSTETRICS & GYNECOLOGY

## 2023-08-25 NOTE — PROGRESS NOTES
ASSESSMENT & PLAN: Key Ashley is a 33 y.o. Matt Contreras with normal gynecologic exam.    1.  Routine well woman exam done today. 2.   Pap and HPV:Pap with HPV was not done today. Current ASCCP Guidelines reviewed. Last Pap  [unfilled] :  no abnormalities. 3.  The patient agreeed to STD testing. chlamydia and gonorrhea testing performed. Safe sex practices have been discussed. 4. The patient is sexually active. She declined contraception and options have been discussed. 5. The following were reviewed in today's visit: STD testing and family planning choices. 6. Patient to return to office in 12 months for WWE. 7. AUB- declines treatment or testing. Discussed possible PCOS/ anovulatory cycles. Advised if she starts to have <4 cycles per year she would benefit from endometrial protection. Pt. Yaron Tapia. All questions have been answered to her satisfaction. CC:  Annual Gynecologic Examination    HPI: Key Ashley is a 33 y.o. Matt Contreras who presents for annual gynecologic examination. She has the following concerns:  None. Declines contraception, would be okay with conception. Menses have become more irregular. Skipped 1 month, then q6 weeks. Not bothered by the irregularity. Health Maintenance:    She wears her seatbelt routinely. She does perform irregular monthly self breast exams. She feels safe at home. Patients does follow a healthy diet.       Past Medical History:   Diagnosis Date   • Anxiety 2015   • COVID 01/2021   • Depression 2015   • Headache    • Heart murmur 2021   • Migraine    • Perianal abscess        Past Surgical History:   Procedure Laterality Date   • COLONOSCOPY     • [place] Theodoro Mussel SURG REQ ANES GENERAL SPI/EDRL DX N/A 8/1/2022    Procedure: EXAM UNDER ANESTHESIA (EUA) SUBCUTANEOUS FISTULOTOMY;  Surgeon: Jaime Bravo MD;  [facility];  Service: Colorectal   • TONSILLECTOMY         Past OB/Gyn History:  Period Pattern: (!) IrregularPatient's last menstrual period was 2023. Menstrual History:  OB History        0    Para   0    Term   0       0    AB   0    Living   0       SAB   0    IAB   0    Ectopic   0    Multiple   0    Live Births   0                  Patient's last menstrual period was 2023. Period Pattern: (!) Irregular    History of sexually transmitted infection No  Patient is currently sexually active. bisexual Birth control: none. Last Pap  [unfilled] :  no abnormalities. Family History   Problem Relation Age of Onset   • Other Mother         KIDNEY MASS BIOPSY NO CANCER    • Anxiety disorder Father    • Bipolar disorder Father    • Suicide Attempts Father    • Alcohol abuse Father    • Anxiety disorder Sister    • Bipolar disorder Sister    • Depression Sister    • Anxiety disorder Brother         Celexa   • Depression Brother    • No Known Problems Maternal Grandmother    • Leukemia Maternal Grandfather    • No Known Problems Paternal Grandmother    • Anuerysm Paternal Grandfather         BRAIN    • Dementia Paternal Grandfather    • Prostate cancer Other    • Colon cancer Other    • Hypertension Other    • Hyperlipidemia Other        Social History:  Social History     Socioeconomic History   • Marital status:      Spouse name: Not on file   • Number of children: Not on file   • Years of education: Not on file   • Highest education level: Not on file   Occupational History   • Not on file   Tobacco Use   • Smoking status: Former     Packs/day: 0.00     Years: 5.00     Total pack years: 0.00     Types: Cigarettes     Start date: 3/15/2014     Quit date: 2018     Years since quittin.1   • Smokeless tobacco: Never   Vaping Use   • Vaping Use: Every day   • Start date: 2018   • Substances: Nicotine   Substance and Sexual Activity   • Alcohol use:  Yes     Alcohol/week: 1.0 standard drink of alcohol     Types: 1 Cans of beer per week     Comment: Socially; 1-2x wk   • Drug use: Not Currently Types: Marijuana     Comment: history of   • Sexual activity: Yes     Partners: Female, Male     Birth control/protection: None   Other Topics Concern   • Not on file   Social History Narrative   • Not on file     Social Determinants of Health     Financial Resource Strain: Not on file   Food Insecurity: Not on file   Transportation Needs: Not on file   Physical Activity: Not on file   Stress: Not on file   Social Connections: Not on file   Intimate Partner Violence: Not on file   Housing Stability: Not on file     Presently lives with partner. Patient is co-habitating. Patient is currently unemployed   Allergies   Allergen Reactions   • Augmentin [Amoxicillin-Pot Clavulanate] Nausea Only       Current Outpatient Medications:   •  ondansetron (Zofran) 4 mg tablet, Take 1 tablet (4 mg total) by mouth every 8 (eight) hours as needed for nausea or vomiting for up to 7 days, Disp: 15 tablet, Rfl: 0    Review of Systems:  Review of Systems   Constitutional: Negative for activity change, chills, fever and unexpected weight change. HENT: Negative for congestion, ear pain, hearing loss and sore throat. Respiratory: Negative for cough, chest tightness and shortness of breath. Cardiovascular: Negative for chest pain and leg swelling. Gastrointestinal: Negative for abdominal pain, constipation, diarrhea, nausea and vomiting. Genitourinary: Positive for menstrual problem. Negative for difficulty urinating, dysuria, frequency, pelvic pain, vaginal discharge and vaginal pain. Skin: Negative for color change and rash. Neurological: Negative for dizziness, numbness and headaches. Psychiatric/Behavioral: Negative for agitation and confusion.        Physical Exam:  /72 (BP Location: Right arm, Patient Position: Sitting, Cuff Size: Standard)   Ht 5' 1" (1.549 m)   Wt 62.9 kg (138 lb 9.6 oz)   LMP 08/01/2023   BMI 26.19 kg/m²    Physical Exam  Constitutional:       General: She is not in acute distress. Appearance: Normal appearance. She is normal weight. Genitourinary:      Vulva, bladder, rectum and urethral meatus normal.      No lesions in the vagina. Right Labia: No rash, tenderness or lesions. Left Labia: No tenderness, lesions or rash. No labial fusion noted. No vaginal discharge or tenderness. No vaginal prolapse present. No vaginal atrophy present. Right Adnexa: not tender, not full and no mass present. Left Adnexa: not tender, not full and no mass present. No cervical motion tenderness or friability. Uterus is not enlarged or prolapsed. HENT:      Head: Normocephalic and atraumatic. Nose: Nose normal.   Eyes:      Conjunctiva/sclera: Conjunctivae normal.      Pupils: Pupils are equal, round, and reactive to light. Cardiovascular:      Rate and Rhythm: Normal rate and regular rhythm. Pulses: Normal pulses. Heart sounds: Normal heart sounds. Pulmonary:      Effort: Pulmonary effort is normal. No respiratory distress. Breath sounds: Normal breath sounds. No wheezing. Abdominal:      General: Abdomen is flat. There is no distension. Palpations: Abdomen is soft. Tenderness: There is no abdominal tenderness. There is no guarding. Musculoskeletal:         General: Normal range of motion. Cervical back: Normal range of motion and neck supple. Neurological:      General: No focal deficit present. Mental Status: She is alert and oriented to person, place, and time. Mental status is at baseline. Skin:     General: Skin is warm and dry. Psychiatric:         Mood and Affect: Mood normal.         Behavior: Behavior normal.         Thought Content: Thought content normal.         Judgment: Judgment normal.   Vitals and nursing note reviewed.

## 2023-08-28 LAB
C TRACH DNA SPEC QL NAA+PROBE: NEGATIVE
N GONORRHOEA DNA SPEC QL NAA+PROBE: NEGATIVE

## 2024-12-01 ENCOUNTER — HOSPITAL ENCOUNTER (EMERGENCY)
Facility: HOSPITAL | Age: 29
Discharge: HOME | End: 2024-12-01
Attending: EMERGENCY MEDICINE
Payer: COMMERCIAL

## 2024-12-01 VITALS
SYSTOLIC BLOOD PRESSURE: 126 MMHG | HEART RATE: 94 BPM | BODY MASS INDEX: 26.19 KG/M2 | DIASTOLIC BLOOD PRESSURE: 83 MMHG | RESPIRATION RATE: 20 BRPM | TEMPERATURE: 97.6 F | OXYGEN SATURATION: 99 % | HEIGHT: 61 IN

## 2024-12-01 DIAGNOSIS — T18.128A FOOD IMPACTION OF ESOPHAGUS, INITIAL ENCOUNTER: Primary | ICD-10-CM
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
## 2024-12-01 PROCEDURE — 99283 EMERGENCY DEPT VISIT LOW MDM: CPT

## 2024-12-01 PROCEDURE — 99284 EMERGENCY DEPT VISIT MOD MDM: CPT | Performed by: EMERGENCY MEDICINE

## 2024-12-01 PROCEDURE — 96374 THER/PROPH/DIAG INJ IV PUSH: CPT

## 2024-12-01 RX ORDER — ONDANSETRON 4 MG/1
4 TABLET, ORALLY DISINTEGRATING ORAL ONCE
Status: COMPLETED | OUTPATIENT
Start: 2024-12-01 | End: 2024-12-01

## 2024-12-01 RX ORDER — ONDANSETRON 2 MG/ML
4 INJECTION INTRAMUSCULAR; INTRAVENOUS ONCE
Status: DISCONTINUED | OUTPATIENT
Start: 2024-12-01 | End: 2024-12-01

## 2024-12-01 RX ADMIN — ONDANSETRON 4 MG: 4 TABLET, ORALLY DISINTEGRATING ORAL at 19:19

## 2024-12-01 RX ADMIN — GLUCAGON 1 MG: 1 INJECTION, POWDER, LYOPHILIZED, FOR SOLUTION INTRAMUSCULAR; INTRAVENOUS at 18:42

## 2024-12-01 NOTE — ED PROVIDER NOTES
Time reflects when diagnosis was documented in both MDM as applicable and the Disposition within this note       Time User Action Codes Description Comment    12/1/2024  6:57 PM Rashi Mina Add [T18.128A,  W44.F3XA] Esophageal obstruction due to food impaction     12/1/2024  6:57 PM Rashi Mina Remove [T18.128A,  W44.F3XA] Esophageal obstruction due to food impaction     12/1/2024  6:57 PM Rashi Mina Add [T18.128A,  W44.F3XA] Food impaction of esophagus, initial encounter           ED Disposition       ED Disposition   Discharge    Condition   Stable    Date/Time   Sun Dec 1, 2024  6:57 PM    Comment   Darrius MENDEZ Dejesso discharge to home/self care.                   Assessment & Plan       Medical Decision Making  Please see ED course    Risk  Prescription drug management.        ED Course as of 12/02/24 0046   Sun Dec 01, 2024   1830 29-year-old female presenting due to foreign body in esophagus.  Patient states she has a history of the same but this is the longest is ever lasted.  Plan for glucagon.    Vitals reviewed, WNL   1855 As patient was about to get glucagon, patient vomited a large piece of turkey.  Patient is now able to talk but states that she still feels a strange sensation in her throat.  Attempting to drink water at this time.  Likely plan for outpatient follow-up with gastroenterology.  Will place referral.   1906 Patient able to tolerate p.o.  Did vomit again and states that she no longer has a foreign body sensation in her throat.  Mild nausea, will give Zofran.  Plan to discharge.       Medications   glucagon (GLUCAGEN) injection 1 mg (1 mg Intravenous Given 12/1/24 1842)   ondansetron (ZOFRAN-ODT) dispersible tablet 4 mg (4 mg Oral Given 12/1/24 1919)       ED Risk Strat Scores                                               History of Present Illness       Chief Complaint   Patient presents with    Foreign Body in Throat     Hx of food stuck in throat. Typically resolves on its own.  Unable to speak. Respirations are unlabored. No stridor       Past Medical History:   Diagnosis Date    Anxiety 2015    COVID 2021    Depression 2015    Headache     Heart murmur     Migraine     Perianal abscess       Past Surgical History:   Procedure Laterality Date    COLONOSCOPY      [place] ANRCT XM SURG REQ ANES GENERAL SPI/EDRL DX N/A 2022    Procedure: EXAM UNDER ANESTHESIA (EUA) SUBCUTANEOUS FISTULOTOMY;  Surgeon: Zach Loyd MD;  Location: [facility];  Service: Colorectal    TONSILLECTOMY        Family History   Problem Relation Age of Onset    Other Mother         KIDNEY MASS BIOPSY NO CANCER     Anxiety disorder Father     Bipolar disorder Father     Suicide Attempts Father     Alcohol abuse Father     Anxiety disorder Sister     Bipolar disorder Sister     Depression Sister     Anxiety disorder Brother         Celexa    Depression Brother     No Known Problems Maternal Grandmother     Leukemia Maternal Grandfather     No Known Problems Paternal Grandmother     Anuerysm Paternal Grandfather         BRAIN     Dementia Paternal Grandfather     Prostate cancer Other     Colon cancer Other     Hypertension Other     Hyperlipidemia Other       Social History     Tobacco Use    Smoking status: Former     Current packs/day: 0.00     Types: Cigarettes     Start date: 3/15/2014     Quit date: 2018     Years since quittin.4    Smokeless tobacco: Never   Vaping Use    Vaping status: Every Day    Start date: 2018    Substances: Nicotine   Substance Use Topics    Alcohol use: Yes     Alcohol/week: 1.0 standard drink of alcohol     Types: 1 Cans of beer per week     Comment: Socially; 1-2x wk    Drug use: Not Currently     Types: Marijuana     Comment: history of      E-Cigarette/Vaping    E-Cigarette Use Current Every Day User     Start Date 18       E-Cigarette/Vaping Substances    Nicotine Yes       I have reviewed and agree with the history as documented.     29-year-old  female presenting due to food getting stuck in her esophagus.  Patient significant other states pt was at home earlier today eating turkey when a piece of turkey got stuck in her throat.  Patient has been able to breathe but is not tolerating secretions and cannot clear the piece of food.  This has happened multiple times in the past which typically clears on its own but patient cannot get it out this time.  Patient's family members also have similar episodes of food getting stuck in their throat.        Review of Systems   Constitutional:  Negative for chills and fever.   HENT:  Positive for trouble swallowing.    Respiratory:  Negative for shortness of breath.            Objective       ED Triage Vitals [12/01/24 1822]   Temperature Pulse Blood Pressure Respirations SpO2 Patient Position - Orthostatic VS   97.6 °F (36.4 °C) 94 126/83 20 99 % Sitting      Temp Source Heart Rate Source BP Location FiO2 (%) Pain Score    Axillary -- Right arm -- --      Vitals      Date and Time Temp Pulse SpO2 Resp BP Pain Score FACES Pain Rating User   12/01/24 1822 97.6 °F (36.4 °C) 94 99 % 20 126/83 -- -- EB            Physical Exam  Vitals and nursing note reviewed.   Constitutional:       Appearance: She is well-developed.   HENT:      Head: Normocephalic and atraumatic.      Nose: Nose normal. No congestion.      Mouth/Throat:      Mouth: Mucous membranes are moist.      Pharynx: Oropharynx is clear.   Eyes:      Extraocular Movements: Extraocular movements intact.      Conjunctiva/sclera: Conjunctivae normal.   Cardiovascular:      Rate and Rhythm: Normal rate and regular rhythm.      Pulses: Normal pulses.      Heart sounds: Normal heart sounds. No murmur heard.  Pulmonary:      Effort: Pulmonary effort is normal. No respiratory distress.      Breath sounds: Normal breath sounds. No stridor. No wheezing, rhonchi or rales.   Chest:      Chest wall: No tenderness.   Abdominal:      General: Abdomen is flat. Bowel sounds are  normal.      Palpations: Abdomen is soft.      Tenderness: There is no abdominal tenderness. There is no right CVA tenderness or left CVA tenderness.   Musculoskeletal:      Cervical back: Normal range of motion and neck supple. No rigidity or tenderness.   Skin:     General: Skin is warm and dry.   Neurological:      Mental Status: She is alert.         Results Reviewed       None            No orders to display       Procedures    ED Medication and Procedure Management   Prior to Admission Medications   Prescriptions Last Dose Informant Patient Reported? Taking?   ondansetron (Zofran) 4 mg tablet   No No   Sig: Take 1 tablet (4 mg total) by mouth every 8 (eight) hours as needed for nausea or vomiting for up to 7 days      Facility-Administered Medications: None     Discharge Medication List as of 12/1/2024  7:12 PM        CONTINUE these medications which have NOT CHANGED    Details   ondansetron (Zofran) 4 mg tablet Take 1 tablet (4 mg total) by mouth every 8 (eight) hours as needed for nausea or vomiting for up to 7 days, Starting Sat 1/14/2023, Until Sat 1/21/2023 at 2359, Normal             ED SEPSIS DOCUMENTATION   Time reflects when diagnosis was documented in both MDM as applicable and the Disposition within this note       Time User Action Codes Description Comment    12/1/2024  6:57 PM Rashi Mina Add [T18.128A,  W44.F3XA] Esophageal obstruction due to food impaction     12/1/2024  6:57 PM Rashi Mina Remove [T18.128A,  W44.F3XA] Esophageal obstruction due to food impaction     12/1/2024  6:57 PM Rashi Mina Add [T18.128A,  W44.F3XA] Food impaction of esophagus, initial encounter                  Rashi Mina MD  12/02/24 0046

## 2024-12-02 ENCOUNTER — TELEPHONE (OUTPATIENT)
Dept: FAMILY MEDICINE CLINIC | Facility: CLINIC | Age: 29
End: 2024-12-02

## 2024-12-02 NOTE — ED ATTENDING ATTESTATION
12/1/2024  I, Baron Harris MD, saw and evaluated the patient. I have discussed the patient with the resident/non-physician practitioner and agree with the resident's/non-physician practitioner's findings, Plan of Care, and MDM as documented in the resident's/non-physician practitioner's note, except where noted. All available labs and Radiology studies were reviewed.  I was present for key portions of any procedure(s) performed by the resident/non-physician practitioner and I was immediately available to provide assistance.    At this point I agree with the current assessment done in the Emergency Department. I have conducted an independent evaluation of this patient a history and physical is as follows:    This is a 29 y.o. old female who presents to the ED for evaluation of difficulty swallowing.  Patient was eating turkey when she felt food got caught and unable to swallow.  She is normal tolerating secretions.  She feels a lot of pressure which is making her short of breath but is having no respiratory distress no stridor.  This has happened to her before.  Reports history of severe reflux as a child, nothing as an adult.  Has never been scoped.  States she has had food get caught like this in the past but usually is self-limited.    Patient appears well nourished, normally developed, no acute distress. Vital signs as documented. Head exam is atraumatic. Pupils EOMI, no scleral icterus or corneal injection noted. Neck is supple without JVD. Respirations are normal without increase work of breathing or audible noises. Cardiac exam shows regular rate and rhythm. Patient is moving all extremities equally, able to ambulate with normal gait under own power. Patient is awake, alert, oriented ×4 without focal neurologic deficit. Skin is warm, dry, intact without rash.    A/P: This is a 29 y.o. female who presents to the ED for evaluation of difficulty swallowing.  Suspect esophageal food bolus.  At the time I saw  the patient, she had vomited after receiving IV glucagon.  She is tolerating secretions at this time.  She does feel nauseous.  Will treat with Zofran, p.o. challenge if she is able to tolerate fluids, will discharge home for outpatient GI follow-up.  Encourage patient to cut her food smaller to help prevent this from happening till she can see GI.      ED Course         Critical Care Time  Procedures

## 2024-12-02 NOTE — DISCHARGE INSTRUCTIONS
Please follow-up with gastroenterology for further evaluation of recurrent food getting stuck in your throat.    Thank you for allowing us to take part in your care.

## 2024-12-02 NOTE — TELEPHONE ENCOUNTER
Lvm for patient to schedule physical or to notify office if they are not using Dr. Singletary as their pcp.

## 2024-12-16 NOTE — CONSULTS
Consultation - colorectal Surgery   Janice Morales 23 y o  female MRN: [id number]  Unit/Bed#: ED 05 Encounter: [id number]    Assessment/Plan     Assessment:  25yo F with perirectal abscess  Patient is hemodynamically stable and does not display any systemic symptoms  She does not have a prior history of such lesions, and does not have any comorbid conditions that would necessitate antibiotic therapy  Plan:  - bedside I&D  - local wound care and daily dressing changes  - p r n  Pain control  - follow-up as outpatient with Dr Adi Serra in 2 weeks  - no need for antibiotics  - follow-up wound cultures  - clear for discharge home    History of Present Illness     HPI:  Janice Morales is a 23 y o  female with history of recent PID who presents with 3 days of worsening perirectal pain  Patient states she noticed a bump in her gluteal cleft approximately 3 days ago that gradually increased in size and intensity of pain  She states it is difficult to sit or walk  Yesterday she states that she experience purulent drainage from lesion  She came to the ER today out of groin concern  She denies fever, chills, constipation, fissures, or anything per rectum  She has no personal or family history of IBD  Upon examination she is exquisitely tender at the left perianal region  CT scan shows a 2 2 x 1 7 cm abscess adjacent to the anal verge      Inpatient consult to Colorectal Surgery  Consult performed by: Devin Clark MD  Consult ordered by: Lauren Kauffman DO          Review of Systems    Historical Information   Past Medical History:   Diagnosis Date    Anxiety 2015    Depression 2015    Headache     Heart murmur 2021    Migraine      Past Surgical History:   Procedure Laterality Date    TONSILLECTOMY       Social History   Social History     Substance and Sexual Activity   Alcohol Use Yes    Alcohol/week: 1 0 standard drink    Types: 1 Cans of beer per week    Comment: Socially     Social History     Substance Please schedule the following by calling [phone]:  -Ultrasound of your liver due in May    Please have labs drawn soon.      Please make the following medications changes:  -Coreg 6.25 mg twice daily    Return to clinic in 6 months    Please call our office with any further questions or concerns [phone] (Cherise)    and Sexual Activity   Drug Use No    Types: Marijuana    Comment: history of     E-Cigarette/Vaping    E-Cigarette Use Current Some Day User     Start Date 18      E-Cigarette/Vaping Substances    Nicotine Yes      Social History     Tobacco Use   Smoking Status Former Smoker    Packs/day: 0 00    Years: 5 00    Pack years: 0 00    Start date: 3/15/2014   [employer] Quit date: 2018    Years since quittin 0   Smokeless Tobacco Never Used     Family History:   Family History   Problem Relation Age of Onset    Other Mother         KIDNEY MASS BIOPSY NO CANCER     Anxiety disorder Father     Bipolar disorder Father     Suicide Attempts Father     Alcohol abuse Father     Anxiety disorder Sister     Bipolar disorder Sister     Depression Sister     Anxiety disorder Brother         Celexa    Depression Brother     No Known Problems Maternal Grandmother     Leukemia Maternal Grandfather     No Known Problems Paternal Grandmother     Anuerysm Paternal Grandfather         BRAIN     Dementia Paternal Grandfather     Prostate cancer Other     Colon cancer Other     Hypertension Other     Hyperlipidemia Other        Meds/Allergies   current meds:   No current facility-administered medications for this encounter      and PTA meds:   None     Allergies   Allergen Reactions    Augmentin [Amoxicillin-Pot Clavulanate] Rash       Objective   First Vitals:   Blood Pressure: 128/76 (22)  Pulse: 101 (22)  Temperature: 98 3 °F (36 8 °C) (22)  Temp Source: Oral (22)  Respirations: 18 (22)  SpO2: 100 % (22)    Current Vitals:   Blood Pressure: 107/60 (22)  Pulse: 98 (22)  Temperature: 98 3 °F (36 8 °C) (22)  Temp Source: Oral (22)  Respirations: 18 (22)  SpO2: 100 % (22)    No intake or output data in the 24 hours ending 22    Invasive Devices  Report    Peripheral Intravenous Line  Duration           Peripheral IV 07/13/22 Right Antecubital <1 day                Physical Exam    Lab Results:   I have personally reviewed pertinent lab results  , CBC:   Lab Results   Component Value Date    WBC 11 20 (H) 07/13/2022    HGB 14 7 07/13/2022    HCT 44 5 07/13/2022    MCV 92 07/13/2022     07/13/2022    MCH 30 4 07/13/2022    MCHC 33 0 07/13/2022    RDW 12 8 07/13/2022    MPV 9 1 07/13/2022    NRBC 0 07/13/2022   , CMP:   Lab Results   Component Value Date    SODIUM 138 07/13/2022    K 3 8 07/13/2022     07/13/2022    CO2 26 07/13/2022    BUN 10 07/13/2022    CREATININE 0 76 07/13/2022    CALCIUM 9 3 07/13/2022    EGFR 109 07/13/2022   , Urinalysis: No results found for: Monika Porteous, SPECGRAV, PHUR, LEUKOCYTESUR, NITRITE, PROTEINUA, GLUCOSEU, KETONESU, BILIRUBINUR, BLOODU  Imaging: I have personally reviewed pertinent reports  CT abdomen pelvis wo contrast    Result Date: 7/13/2022  Impression: 2 2 x 1 7 cm structure located posterolateral to the anal verge possibly representing an abscess  Limited assessment without IV contrast  The study was marked in EPIC for immediate notification  Workstation performed: [id number]     EKG, Pathology, and Other Studies: I have personally reviewed pertinent reports  Counseling / Coordination of Care  Total floor / unit time spent today 60 minutes  Greater than 50% of total time was spent with the patient and / or family counseling and / or coordination of care    A description of the counseling / coordination of care: N/A

## 2025-06-05 ENCOUNTER — OFFICE VISIT (OUTPATIENT)
Dept: GASTROENTEROLOGY | Facility: AMBULARY SURGERY CENTER | Age: 30
End: 2025-06-05
Payer: COMMERCIAL

## 2025-06-05 ENCOUNTER — ANESTHESIA EVENT (OUTPATIENT)
Dept: ANESTHESIOLOGY | Facility: HOSPITAL | Age: 30
End: 2025-06-05

## 2025-06-05 ENCOUNTER — ANESTHESIA (OUTPATIENT)
Dept: ANESTHESIOLOGY | Facility: HOSPITAL | Age: 30
End: 2025-06-05

## 2025-06-05 VITALS
HEIGHT: 61 IN | WEIGHT: 138.4 LBS | DIASTOLIC BLOOD PRESSURE: 78 MMHG | BODY MASS INDEX: 26.13 KG/M2 | OXYGEN SATURATION: 100 % | SYSTOLIC BLOOD PRESSURE: 120 MMHG | HEART RATE: 82 BPM

## 2025-06-05 DIAGNOSIS — R13.19 ESOPHAGEAL DYSPHAGIA: Primary | ICD-10-CM

## 2025-06-05 DIAGNOSIS — Z84.81 FAMILY HISTORY OF GENE MUTATION: ICD-10-CM

## 2025-06-05 DIAGNOSIS — Z87.19 HISTORY OF CELIAC DISEASE: ICD-10-CM

## 2025-06-05 PROCEDURE — 99204 OFFICE O/P NEW MOD 45 MIN: CPT | Performed by: PHYSICIAN ASSISTANT

## 2025-06-05 RX ORDER — OMEPRAZOLE 40 MG/1
40 CAPSULE, DELAYED RELEASE ORAL
Qty: 30 CAPSULE | Refills: 2 | Status: SHIPPED | OUTPATIENT
Start: 2025-06-05

## 2025-06-05 RX ORDER — SODIUM CHLORIDE, SODIUM LACTATE, POTASSIUM CHLORIDE, CALCIUM CHLORIDE 600; 310; 30; 20 MG/100ML; MG/100ML; MG/100ML; MG/100ML
125 INJECTION, SOLUTION INTRAVENOUS CONTINUOUS
Status: CANCELLED | OUTPATIENT
Start: 2025-06-05

## 2025-06-05 RX ORDER — DEXTROAMPHETAMINE SACCHARATE, AMPHETAMINE ASPARTATE MONOHYDRATE, DEXTROAMPHETAMINE SULFATE AND AMPHETAMINE SULFATE 3.75; 3.75; 3.75; 3.75 MG/1; MG/1; MG/1; MG/1
15 CAPSULE, EXTENDED RELEASE ORAL EVERY MORNING
COMMUNITY
Start: 2024-05-01

## 2025-06-05 NOTE — PROGRESS NOTES
Name: Darrius Roberst      : 1995      MRN: [id number]  Encounter Provider: Codie Disla PA-C  Encounter Date: 2025   Encounter department: [facility]  :  Assessment & Plan  Esophageal dysphagia  Intermittent symptoms for many years.  EGD in  with reported esophageal scarring, inflammation, possible celiac and peanut allergy. Denies known EOE.  She has been off of medication.  She is reporting dysphagia symptoms most days of the week. Concern for eosinophilic esophagitis.    - Plan for EGD with biopsies to rule out EOE and celiac.  - Discussed the risks of the procedure.    - Due to severity of symptoms at this time, will start on omeprazole 40 mg once daily  -Continue to chew food slowly, take time when eating.  - Avoid known food triggers.  - If EOE is noted, would recommend referral to allergist.  -Will check celiac serology as well as routine labs with CBC and CMP.    History of celiac disease  Patient reports she was told she had borderline celiac disease around .  She went gluten-free for a little bit and felt better however since stopped this.    - Will check celiac serology and biopsies on EGD  -If workup is unremarkable could still consider going gluten-free as she reports this has helped some of her GI symptoms.    Family history of gene mutation  Patient reports her mother having a gene mutation that she was recommended to get tested for.  She is not sure which 1 this is specifically, reports it starts with an M, ?MTHFR.    - I placed a genetic referral.  She will look more into this to have more information for them.        History of Present Illness   Darrius Roberts is a 29 y.o. female who presents due to history of food impactions.    Patient went to the emergency room 2024 for esophageal food impaction with chicken.  During the ER visit she vomited this up and symptoms resolved.    Patient has very longstanding history of symptoms.  She  "reports evaluation with GI at the age of 17/18 around 10 years ago.  She reports she had an EGD at that time and was told she had borderline celiac disease, esophageal peanut allergy, esophageal scarring and inflammation.  She was placed on Prevacid.  She does not report being told about eosinophilic esophagitis.  She reports going gluten-free for a little bit and felt better however then stopped this.  She reports during childhood she had a significant mount of vomiting in the morning which she attributed to anxiety.  This is since resolved.  Currently she is having dysphagia symptoms most days of the week, often with chicken and rice products.  They typically self resolve.  She is not on any medications.    Patient quit smoking about 5 years ago.  She is currently vaping and having difficulty coming off of this.  No significant alcohol use.  She uses Excedrin or ibuprofen about 2 times a month.    She does report her mother having a gene mutation that she was recommended to get tested for.  She is not sure which 1 this is specifically, reports it starts with an M.    Last lab work 2 years ago with normal CBC and liver enzymes.    Reported EGD in 2014    HPI    Review of Systems   All other systems reviewed and are negative.   A complete review of systems is negative other than that noted above in the HPI.      Current Medications[1]  Objective   /78 (BP Location: Left arm, Patient Position: Sitting, Cuff Size: Standard)   Pulse 82   Ht 5' 1\" (1.549 m)   Wt 62.8 kg (138 lb 6.4 oz)   SpO2 100%   BMI 26.15 kg/m²     Physical Exam  Vitals reviewed.   Constitutional:       General: She is not in acute distress.     Appearance: Normal appearance. She is not ill-appearing.   HENT:      Head: Normocephalic and atraumatic.     Eyes:      Conjunctiva/sclera: Conjunctivae normal.       Cardiovascular:      Rate and Rhythm: Normal rate and regular rhythm.      Heart sounds: No murmur heard.  Pulmonary:      Effort: " Pulmonary effort is normal. No respiratory distress.      Breath sounds: Normal breath sounds.   Abdominal:      General: Abdomen is flat. There is no distension.      Palpations: Abdomen is soft.      Tenderness: There is no abdominal tenderness. There is no guarding or rebound.     Musculoskeletal:         General: No swelling.      Cervical back: Normal range of motion.      Right lower leg: No edema.      Left lower leg: No edema.     Skin:     General: Skin is warm.      Coloration: Skin is not jaundiced.     Neurological:      General: No focal deficit present.      Mental Status: She is alert and oriented to person, place, and time. Mental status is at baseline.     Psychiatric:         Mood and Affect: Mood normal.         Behavior: Behavior normal.            Lab Results: I personally reviewed relevant lab results.                    [1]   Current Outpatient Medications   Medication Sig Dispense Refill    amphetamine-dextroamphetamine (ADDERALL XR) 15 MG 24 hr capsule Take 15 mg by mouth every morning      omeprazole (PriLOSEC) 40 MG capsule Take 1 capsule (40 mg total) by mouth daily before breakfast 30 capsule 2    ondansetron (Zofran) 4 mg tablet Take 1 tablet (4 mg total) by mouth every 8 (eight) hours as needed for nausea or vomiting for up to 7 days 15 tablet 0     No current facility-administered medications for this visit.

## 2025-06-05 NOTE — PATIENT INSTRUCTIONS
Scheduled date of EGD(as of today): 6/13/2025  Physician performing EGD: Dr. Tirado  Location of EGD: [facility]  Instructions reviewed with patient by: Lorena PINA  Clearances: N/A

## 2025-06-13 ENCOUNTER — HOSPITAL ENCOUNTER (OUTPATIENT)
Dept: GASTROENTEROLOGY | Facility: AMBULARY SURGERY CENTER | Age: 30
Setting detail: OUTPATIENT SURGERY
End: 2025-06-13
Attending: PHYSICIAN ASSISTANT
Payer: COMMERCIAL

## 2025-06-13 ENCOUNTER — ANESTHESIA (OUTPATIENT)
Dept: GASTROENTEROLOGY | Facility: AMBULARY SURGERY CENTER | Age: 30
End: 2025-06-13
Payer: COMMERCIAL

## 2025-06-13 VITALS
RESPIRATION RATE: 20 BRPM | SYSTOLIC BLOOD PRESSURE: 103 MMHG | HEIGHT: 61 IN | DIASTOLIC BLOOD PRESSURE: 58 MMHG | BODY MASS INDEX: 25.71 KG/M2 | WEIGHT: 136.2 LBS | OXYGEN SATURATION: 99 % | HEART RATE: 68 BPM | TEMPERATURE: 98.6 F

## 2025-06-13 DIAGNOSIS — R13.19 ESOPHAGEAL DYSPHAGIA: ICD-10-CM

## 2025-06-13 LAB
EXT PREGNANCY TEST URINE: NEGATIVE
EXT. CONTROL: NORMAL

## 2025-06-13 PROCEDURE — 81025 URINE PREGNANCY TEST: CPT | Performed by: INTERNAL MEDICINE

## 2025-06-13 PROCEDURE — 43239 EGD BIOPSY SINGLE/MULTIPLE: CPT | Performed by: INTERNAL MEDICINE

## 2025-06-13 PROCEDURE — 88305 TISSUE EXAM BY PATHOLOGIST: CPT | Performed by: STUDENT IN AN ORGANIZED HEALTH CARE EDUCATION/TRAINING PROGRAM

## 2025-06-13 RX ORDER — SODIUM CHLORIDE, SODIUM LACTATE, POTASSIUM CHLORIDE, CALCIUM CHLORIDE 600; 310; 30; 20 MG/100ML; MG/100ML; MG/100ML; MG/100ML
125 INJECTION, SOLUTION INTRAVENOUS CONTINUOUS
Status: DISCONTINUED | OUTPATIENT
Start: 2025-06-13 | End: 2025-06-17 | Stop reason: HOSPADM

## 2025-06-13 RX ORDER — SODIUM CHLORIDE, SODIUM LACTATE, POTASSIUM CHLORIDE, CALCIUM CHLORIDE 600; 310; 30; 20 MG/100ML; MG/100ML; MG/100ML; MG/100ML
INJECTION, SOLUTION INTRAVENOUS CONTINUOUS PRN
Status: DISCONTINUED | OUTPATIENT
Start: 2025-06-13 | End: 2025-06-13

## 2025-06-13 RX ORDER — LIDOCAINE HYDROCHLORIDE 20 MG/ML
INJECTION, SOLUTION EPIDURAL; INFILTRATION; INTRACAUDAL; PERINEURAL AS NEEDED
Status: DISCONTINUED | OUTPATIENT
Start: 2025-06-13 | End: 2025-06-13

## 2025-06-13 RX ORDER — PROPOFOL 10 MG/ML
INJECTION, EMULSION INTRAVENOUS AS NEEDED
Status: DISCONTINUED | OUTPATIENT
Start: 2025-06-13 | End: 2025-06-13

## 2025-06-13 RX ADMIN — LIDOCAINE HYDROCHLORIDE 100 MG: 20 INJECTION, SOLUTION EPIDURAL; INFILTRATION; INTRACAUDAL at 11:38

## 2025-06-13 RX ADMIN — SODIUM CHLORIDE, SODIUM LACTATE, POTASSIUM CHLORIDE, AND CALCIUM CHLORIDE: .6; .31; .03; .02 INJECTION, SOLUTION INTRAVENOUS at 11:35

## 2025-06-13 RX ADMIN — PROPOFOL 50 MG: 10 INJECTION, EMULSION INTRAVENOUS at 11:40

## 2025-06-13 RX ADMIN — PROPOFOL 200 MG: 10 INJECTION, EMULSION INTRAVENOUS at 11:38

## 2025-06-13 NOTE — ANESTHESIA PREPROCEDURE EVALUATION
Procedure:  EGD    Relevant Problems   CARDIO   (+) Cardiac murmur   (+) Migraine with aura and without status migrainosus, not intractable      NEURO/PSYCH   (+) Anxiety   (+) MDD (major depressive disorder), recurrent episode, mild (HCC)   (+) Migraine with aura and without status migrainosus, not intractable        Physical Exam    Airway     Mallampati score: II  TM Distance: >3 FB  Neck ROM: full      Cardiovascular      Dental       Pulmonary      Neurological      Other Findings  post-pubertal.      Anesthesia Plan  ASA Score- 1     Anesthesia Type- IV sedation with anesthesia with ASA Monitors.         Additional Monitors:     Airway Plan:            Plan Factors-Exercise tolerance (METS): >4 METS.    Chart reviewed.                      Induction- intravenous.    Postoperative Plan- .   Monitoring Plan - Monitoring plan - standard ASA monitoring  Post Operative Pain Plan - multimodal analgesia    Perioperative Resuscitation Plan - Level 1 - Full Code.       Informed Consent- Anesthetic plan and risks discussed with patient.  I personally reviewed this patient with the CRNA. Discussed and agreed on the Anesthesia Plan with the CRNA..      NPO Status:  Vitals Value Taken Time   Date of last liquid 06/12/25 06/13/25 10:23   Time of last liquid 2230 06/13/25 10:23   Date of last solid 06/12/25 06/13/25 10:23   Time of last solid 2230 06/13/25 10:23     NPO appropriate. Discussed benefits/risks of monitored anesthetic care and discussed providing a dynamic level of mild to deep sedation. Risks include awareness, airway obstruction, aspiration which may necessitate conversion to general anesthesia. All questions answered. Patient understands and wishes to proceed.    Anesthesia plan and consent discussed with Darrius who expressed understanding and agreement. Risks/benefits and alternatives discussed with patient including possible PONV, sore throat, damage to teeth/lips/gums and possibility of rare anesthetic and  surgical emergencies.

## 2025-06-13 NOTE — ANESTHESIA POSTPROCEDURE EVALUATION
Post-Op Assessment Note    CV Status:  Stable  Pain Score: 0    Pain management: adequate       Mental Status:  Sleepy   Hydration Status:  Euvolemic   PONV Controlled:  Controlled   Airway Patency:  Patent     Post Op Vitals Reviewed: Yes    No anethesia notable event occurred.    Staff: Anesthesiologist, CRNA         Last Filed PACU Vitals:  Vitals Value Taken Time   Temp     Pulse 82 06/13/25 11:43   /54 06/13/25 11:43   Resp 12 06/13/25 11:43   SpO2 99 % 06/13/25 11:43

## 2025-06-13 NOTE — H&P
"History and Physical -  [facility]  Darrius Roberts 29 y.o. female MRN: [id number]    HPI: Darrius Roberts is a 29 y.o. year old female who presents evaluation of dysphagia symptoms.      Review of Systems    Historical Information   Past Medical History[1]  Past Surgical History[2]  Social History   Social History     Substance and Sexual Activity   Alcohol Use Yes    Alcohol/week: 1.0 standard drink of alcohol    Types: 1 Cans of beer per week    Comment: Socially; 1-2x wk     Social History     Substance and Sexual Activity   Drug Use Not Currently    Types: Marijuana    Comment: history of     Tobacco Use History[3]  Family History[4]    Meds/Allergies     Not in a hospital admission.    Allergies[5]    Objective     /65   Pulse 82   Temp (!) 96.8 °F (36 °C) (Temporal)   Resp 18   Ht 5' 1\" (1.549 m)   Wt 61.8 kg (136 lb 3.2 oz)   SpO2 100%   BMI 25.73 kg/m²       PHYSICAL EXAM    Gen: NAD  CV: RRR  CHEST: Clear  ABD: soft, NT/ND  EXT: no edema  Neuro: AAO      ASSESSMENT/PLAN:  This is a 29 y.o. year old female here for evaluation of dysphagia symptoms.    PLAN:   Procedure: EGD.           [1]   Past Medical History:  Diagnosis Date    Anxiety     COVID 2021    Depression     Headache     Heart murmur     Migraine     Perianal abscess    [2]   Past Surgical History:  Procedure Laterality Date    COLONOSCOPY      IN ANRCT XM SURG REQ ANES GENERAL SPI/EDRL DX N/A 2022    Procedure: EXAM UNDER ANESTHESIA (EUA) SUBCUTANEOUS FISTULOTOMY;  Surgeon: Zach Loyd MD;  Location: [facility] MAIN [facility];  Service: Colorectal    TONSILLECTOMY     [3]   Social History  Tobacco Use   Smoking Status Former    Current packs/day: 0.00    Types: Cigarettes    Start date: 3/15/2014    Quit date: 2018    Years since quittin.9   Smokeless Tobacco Never   [4]   Family History  Problem Relation Name Age of Onset    Other Mother          KIDNEY MASS BIOPSY NO CANCER     Anxiety " disorder Father Kenn     Bipolar disorder Father Kenn     Suicide Attempts Father Kenn     Alcohol abuse Father Kenn     Anxiety disorder Sister HALF SISITER     Bipolar disorder Sister HALF SISITER     Depression Sister HALF SISITER     Anxiety disorder Brother          Celexa    Depression Brother      No Known Problems Maternal Grandmother      Leukemia Maternal Grandfather      No Known Problems Paternal Grandmother      Anuerysm Paternal Grandfather          BRAIN     Dementia Paternal Grandfather      Prostate cancer Other Great Grand Father     Colon cancer Other Great Grand Mother     Hypertension Other Great Grand Mother     Hyperlipidemia Other Great Grand Mother    [5]   Allergies  Allergen Reactions    Augmentin [Amoxicillin-Pot Clavulanate] Nausea Only

## 2025-06-17 PROCEDURE — 88305 TISSUE EXAM BY PATHOLOGIST: CPT | Performed by: STUDENT IN AN ORGANIZED HEALTH CARE EDUCATION/TRAINING PROGRAM

## 2025-06-22 PROBLEM — K20.0 EOSINOPHILIC ESOPHAGITIS: Status: ACTIVE | Noted: 2025-06-22

## 2025-06-24 DIAGNOSIS — R13.19 ESOPHAGEAL DYSPHAGIA: ICD-10-CM

## 2025-06-24 DIAGNOSIS — K20.0 EOSINOPHILIC ESOPHAGITIS: Primary | ICD-10-CM

## 2025-06-24 RX ORDER — OMEPRAZOLE 40 MG/1
40 CAPSULE, DELAYED RELEASE ORAL 2 TIMES DAILY
Qty: 180 CAPSULE | Refills: 2 | Status: SHIPPED | OUTPATIENT
Start: 2025-06-24 | End: 2025-09-22

## (undated) DEVICE — POOLE SUCTION HANDLE: Brand: CARDINAL HEALTH

## (undated) DEVICE — PAD GROUNDING ADULT

## (undated) DEVICE — TUBING SUCTION 5MM X 12 FT

## (undated) DEVICE — NONREINFORCED SURGICAL GOWN, 4XLARGE: Brand: CONVERTORS

## (undated) DEVICE — NEEDLE HYPO 22G X 1-1/2 IN

## (undated) DEVICE — INTENDED FOR TISSUE SEPARATION, AND OTHER PROCEDURES THAT REQUIRE A SHARP SURGICAL BLADE TO PUNCTURE OR CUT.: Brand: BARD-PARKER SAFETY BLADES SIZE 15, STERILE

## (undated) DEVICE — GLOVE INDICATOR PI UNDERGLOVE SZ 7.5 BLUE

## (undated) DEVICE — 3M™ DURAPORE™ SURGICAL TAPE 1538-3, 3 INCH X 10 YARD (7,5CM X 9,1M), 4 ROLLS/BOX: Brand: 3M™ DURAPORE™

## (undated) DEVICE — GLOVE SRG BIOGEL 7.5

## (undated) DEVICE — SUT VICRYL 0 UR-6 27 IN J603H

## (undated) DEVICE — DISPOSABLE BRIEF/UNDERWEAR

## (undated) DEVICE — PLUMEPEN PRO 10FT

## (undated) DEVICE — BETHLEHEM UNIVERSAL MINOR GEN: Brand: CARDINAL HEALTH

## (undated) DEVICE — GAUZE SPONGES,16 PLY: Brand: CURITY

## (undated) DEVICE — VIAL DECANTER

## (undated) DEVICE — SURGIFOAM 8.5 X 12.5

## (undated) DEVICE — STERILE SURGICAL LUBRICANT,  TUBE: Brand: SURGILUBE

## (undated) DEVICE — BASIC SINGLE BASIN 2-LF: Brand: MEDLINE INDUSTRIES, INC.

## (undated) DEVICE — SPONGE STICK WITH PVP-I: Brand: KENDALL

## (undated) DEVICE — SUT VICRYL 3-0 SH 27 IN J416H